# Patient Record
Sex: FEMALE | Race: WHITE | Employment: OTHER | URBAN - METROPOLITAN AREA
[De-identification: names, ages, dates, MRNs, and addresses within clinical notes are randomized per-mention and may not be internally consistent; named-entity substitution may affect disease eponyms.]

---

## 2017-01-20 ENCOUNTER — OFFICE VISIT (OUTPATIENT)
Dept: SURGERY | Facility: CLINIC | Age: 37
End: 2017-01-20

## 2017-01-20 VITALS — WEIGHT: 134.19 LBS | BODY MASS INDEX: 20.34 KG/M2 | HEIGHT: 68 IN

## 2017-01-20 DIAGNOSIS — K64.8 INTERNAL AND EXTERNAL THROMBOSED HEMORRHOIDS: Primary | ICD-10-CM

## 2017-01-20 DIAGNOSIS — K64.5 INTERNAL AND EXTERNAL THROMBOSED HEMORRHOIDS: Primary | ICD-10-CM

## 2017-01-20 PROCEDURE — 99204 OFFICE O/P NEW MOD 45 MIN: CPT | Performed by: SURGERY

## 2017-01-20 PROCEDURE — 46255 REMOVE INT/EXT HEM 1 GROUP: CPT | Performed by: SURGERY

## 2017-01-20 RX ORDER — PRAMOXINE HYDROCHLORIDE AND HYDROCORTISONE ACETATE 100; 100 MG/10G; MG/10G
AEROSOL, FOAM TOPICAL
Refills: 3 | COMMUNITY
Start: 2017-01-06 | End: 2019-09-16

## 2017-01-20 NOTE — PROGRESS NOTES
Visit Note    Active Problems   Internal and external thrombosed hemorrhoids  (primary encounter diagnosis)  Chief Complaint: Patient presents with:  Hemorrhoids: Patient referred by Dr. Madai Benton for hemorrhoids.  Patient states she was in [facility] for [place] Topics   Smoking status: Former Smoker  0.50 Packs/Day     Types: Cigarettes    Quit date: 07/01/2014    Smokeless tobacco: Former User    Alcohol Use: Yes  0.0 oz/week    0 Standard drinks or equivalent per week         Comment: 2-3 times a week     Drug Musculoskeletal: Normal range of motion. She exhibits no edema or tenderness. Neurological: She is alert and oriented to person, place, and time. She has normal reflexes. She displays normal reflexes. No cranial nerve deficit.  She exhibits normal

## 2017-01-23 NOTE — PROCEDURES
Pros and cons and possible complication were presented and discussed, infection, bleeding/Hematoma/ seroma,  Injury to nearby sphincter, recurrence, Failure to heal, fistula, cardiorespiratory events, Pain acute or Chronic, risks of local anesthesia, etc.

## 2017-01-23 NOTE — PATIENT INSTRUCTIONS
Simple Hemorrhoidectomy was done for a Right sided Thrombosed Hemorrhoid. Patient was instructed on post Hemorrhoidectomy Routine, local Care Analgesia and to return in 2 weeks for a wound check.  Also instructed to call ua as needed for any concerns regard

## 2020-02-28 ENCOUNTER — HOSPITAL ENCOUNTER (OUTPATIENT)
Age: 40
Discharge: HOME | End: 2020-02-28
Attending: EMERGENCY MEDICINE
Payer: COMMERCIAL

## 2020-02-28 VITALS
HEIGHT: 65 IN | TEMPERATURE: 99 F | RESPIRATION RATE: 20 BRPM | BODY MASS INDEX: 22.49 KG/M2 | OXYGEN SATURATION: 100 % | SYSTOLIC BLOOD PRESSURE: 135 MMHG | HEART RATE: 110 BPM | DIASTOLIC BLOOD PRESSURE: 94 MMHG | WEIGHT: 135 LBS

## 2020-02-28 DIAGNOSIS — J11.1 INFLUENZA: Primary | ICD-10-CM

## 2020-02-28 PROCEDURE — 99214 OFFICE O/P EST MOD 30 MIN: CPT

## 2020-02-28 PROCEDURE — 99213 OFFICE O/P EST LOW 20 MIN: CPT

## 2020-02-28 RX ORDER — OSELTAMIVIR PHOSPHATE 75 MG/1
75 CAPSULE ORAL 2 TIMES DAILY
Qty: 10 CAPSULE | Refills: 0 | Status: SHIPPED | OUTPATIENT
Start: 2020-02-28 | End: 2020-03-04

## 2020-02-29 NOTE — ED PROVIDER NOTES
Patient Seen in: [facility]      History   Patient presents with:  Cough/URI    Stated Complaint: cough    HPI    Patient is a 49-year-old female with a 1 day history of fever myalgias and cough cough.   There is no headac Eyes: Conjunctivae and EOM are normal. Pupils are equal, round, and reactive to light. Neck: Neck supple. Cardiovascular: Normal rate, regular rhythm, normal heart sounds and intact distal pulses.     Pulmonary/Chest: Effort normal and breath sounds n

## 2020-03-04 ENCOUNTER — OFFICE VISIT (OUTPATIENT)
Dept: INTERNAL MEDICINE CLINIC | Facility: CLINIC | Age: 40
End: 2020-03-04
Payer: COMMERCIAL

## 2020-03-04 VITALS
HEIGHT: 65 IN | BODY MASS INDEX: 21.39 KG/M2 | TEMPERATURE: 98 F | DIASTOLIC BLOOD PRESSURE: 85 MMHG | SYSTOLIC BLOOD PRESSURE: 121 MMHG | HEART RATE: 91 BPM | WEIGHT: 128.38 LBS

## 2020-03-04 DIAGNOSIS — R05.9 COUGH: ICD-10-CM

## 2020-03-04 DIAGNOSIS — J11.1 INFLUENZA: Primary | ICD-10-CM

## 2020-03-04 PROCEDURE — 99203 OFFICE O/P NEW LOW 30 MIN: CPT | Performed by: PHYSICIAN ASSISTANT

## 2020-03-04 RX ORDER — ALBUTEROL SULFATE 90 UG/1
2 AEROSOL, METERED RESPIRATORY (INHALATION) EVERY 4 HOURS PRN
Qty: 1 INHALER | Refills: 0 | Status: SHIPPED | OUTPATIENT
Start: 2020-03-04 | End: 2020-03-10

## 2020-03-04 RX ORDER — BENZONATATE 200 MG/1
200 CAPSULE ORAL 3 TIMES DAILY PRN
Qty: 30 CAPSULE | Refills: 0 | Status: SHIPPED | OUTPATIENT
Start: 2020-03-04 | End: 2020-03-10

## 2020-03-04 NOTE — PROGRESS NOTES
HPI:    Patient ID: Kelsey Jung is a 44year old female. HPI   Pt presents for follow up from , was diagnosed with the flu on 2/28 but did not start tamiflu because she was already nauseous. Notes fatigue and cough today.  No fever at this time m)   Wt 128 lb 6.4 oz (58.2 kg)   BMI 21.37 kg/m²   Body mass index is 21.37 kg/m². Physical Exam    Constitutional: She is oriented to person, place, and time and thin. She appears well-developed and well-nourished.    HENT:   Head: Normocephalic and atra

## 2020-03-10 ENCOUNTER — OFFICE VISIT (OUTPATIENT)
Dept: INTERNAL MEDICINE CLINIC | Facility: CLINIC | Age: 40
End: 2020-03-10
Payer: COMMERCIAL

## 2020-03-10 VITALS
HEIGHT: 65 IN | SYSTOLIC BLOOD PRESSURE: 103 MMHG | TEMPERATURE: 98 F | HEART RATE: 73 BPM | WEIGHT: 129.81 LBS | DIASTOLIC BLOOD PRESSURE: 71 MMHG | BODY MASS INDEX: 21.63 KG/M2

## 2020-03-10 DIAGNOSIS — Z00.00 ROUTINE PHYSICAL EXAMINATION: Primary | ICD-10-CM

## 2020-03-10 PROCEDURE — 99395 PREV VISIT EST AGE 18-39: CPT | Performed by: PHYSICIAN ASSISTANT

## 2020-03-10 NOTE — PROGRESS NOTES
HPI:    Patient ID: Edyta Reed is a 44year old female. HPI   Patient presents for a physical, doing well no complaints.  Last mammogram was a yeah and half ago, pt had breast pain, everything was normal. Last  Pap was done 2019 and everything was n normal.   Nose: Nose normal.   Mouth/Throat: Oropharynx is clear and moist.   Eyes: Pupils are equal, round, and reactive to light. Conjunctivae and EOM are normal.   Neck: Normal range of motion. Neck supple. No thyromegaly present.    Cardiovascular: Norm or ordered in this encounter       Imaging & Referrals:  None         ID#[id number]

## 2020-03-17 RX ORDER — ALBUTEROL SULFATE 90 UG/1
AEROSOL, METERED RESPIRATORY (INHALATION)
Qty: 6.7 INHALER | Refills: 0 | Status: SHIPPED | OUTPATIENT
Start: 2020-03-17 | End: 2021-09-10

## 2021-08-16 LAB — AMB EXT COVID-19 RESULT: DETECTED

## 2021-08-26 ENCOUNTER — HOSPITAL ENCOUNTER (OUTPATIENT)
Age: 41
Discharge: HOME | End: 2021-08-26
Payer: COMMERCIAL

## 2021-08-26 ENCOUNTER — APPOINTMENT (OUTPATIENT)
Dept: GENERAL RADIOLOGY | Age: 41
End: 2021-08-26
Attending: NURSE PRACTITIONER
Payer: COMMERCIAL

## 2021-08-26 VITALS
HEART RATE: 88 BPM | DIASTOLIC BLOOD PRESSURE: 71 MMHG | RESPIRATION RATE: 20 BRPM | TEMPERATURE: 101 F | OXYGEN SATURATION: 98 % | BODY MASS INDEX: 22 KG/M2 | SYSTOLIC BLOOD PRESSURE: 108 MMHG | WEIGHT: 130 LBS

## 2021-08-26 DIAGNOSIS — J12.82 PNEUMONIA DUE TO COVID-19 VIRUS: Primary | ICD-10-CM

## 2021-08-26 DIAGNOSIS — U07.1 PNEUMONIA DUE TO COVID-19 VIRUS: Primary | ICD-10-CM

## 2021-08-26 PROCEDURE — 71046 X-RAY EXAM CHEST 2 VIEWS: CPT | Performed by: NURSE PRACTITIONER

## 2021-08-26 PROCEDURE — 99213 OFFICE O/P EST LOW 20 MIN: CPT

## 2021-08-26 RX ORDER — ALBUTEROL SULFATE 90 UG/1
2 AEROSOL, METERED RESPIRATORY (INHALATION) EVERY 4 HOURS PRN
Qty: 8 G | Refills: 0 | Status: SHIPPED | OUTPATIENT
Start: 2021-08-26 | End: 2021-09-10

## 2021-08-26 RX ORDER — BENZONATATE 100 MG/1
100 CAPSULE ORAL 2 TIMES DAILY PRN
Qty: 10 CAPSULE | Refills: 0 | Status: SHIPPED | OUTPATIENT
Start: 2021-08-26 | End: 2021-09-10

## 2021-08-26 RX ORDER — ACETAMINOPHEN 500 MG
1000 TABLET ORAL ONCE
Status: COMPLETED | OUTPATIENT
Start: 2021-08-26 | End: 2021-08-26

## 2021-08-26 RX ORDER — DOXYCYCLINE HYCLATE 100 MG/1
100 CAPSULE ORAL 2 TIMES DAILY
Qty: 14 CAPSULE | Refills: 0 | Status: SHIPPED | OUTPATIENT
Start: 2021-08-26 | End: 2021-09-02

## 2021-08-26 RX ORDER — PREDNISONE 20 MG/1
40 TABLET ORAL DAILY
Qty: 10 TABLET | Refills: 0 | Status: SHIPPED | OUTPATIENT
Start: 2021-08-26 | End: 2021-08-31

## 2021-08-26 NOTE — ED PROVIDER NOTES
Patient Seen in: [facility]      History   Patient presents with:  Cough/URI    Stated Complaint: bad cough/ + for covid    HPI/Subjective:   HPI    This is a 44-year-old female presenting for cough.   Patient states, 2 weeks ago she developed External ear normal.      Nose: Nose normal.      Mouth/Throat:      Mouth: Mucous membranes are moist.      Pharynx: Oropharynx is clear. Eyes:      Conjunctiva/sclera: Conjunctivae normal.   Cardiovascular:      Rate and Rhythm: Normal rate.    Pulmonar paperwork. Patient acknowledged understanding instructions.                          Disposition and Plan     Clinical Impression:  Pneumonia due to COVID-19 virus  (primary encounter diagnosis)     Disposition:  Discharge  8/26/2021  4:21 pm    Follow-up:

## 2021-08-28 LAB — AMB EXT COVID-19 RESULT: NOT DETECTED

## 2021-08-31 ENCOUNTER — TELEPHONE (OUTPATIENT)
Dept: FAMILY MEDICINE CLINIC | Facility: CLINIC | Age: 41
End: 2021-08-31

## 2021-08-31 NOTE — TELEPHONE ENCOUNTER
I will only see this patient virtually. If she wants someone in person then another provider will have to see her.

## 2021-08-31 NOTE — TELEPHONE ENCOUNTER
Patient states she was seen in [facility]/26/[facility], treated for pneumonia. Patient started having symptoms of COVID-19  19 days ago on 8/13/21, positive test on 8/16/21.     Patient was prescribed steroids and antibiotics 8/26/21  for pneumonia and was

## 2021-09-10 ENCOUNTER — OFFICE VISIT (OUTPATIENT)
Dept: INTERNAL MEDICINE CLINIC | Facility: CLINIC | Age: 41
End: 2021-09-10
Payer: COMMERCIAL

## 2021-09-10 VITALS
BODY MASS INDEX: 21.33 KG/M2 | WEIGHT: 128 LBS | SYSTOLIC BLOOD PRESSURE: 107 MMHG | HEART RATE: 85 BPM | HEIGHT: 65 IN | RESPIRATION RATE: 17 BRPM | DIASTOLIC BLOOD PRESSURE: 69 MMHG

## 2021-09-10 DIAGNOSIS — Z86.16 HISTORY OF COVID-19: Primary | ICD-10-CM

## 2021-09-10 DIAGNOSIS — R05.8 DRY COUGH: ICD-10-CM

## 2021-09-10 DIAGNOSIS — S00.521A BLISTER OF LIP: ICD-10-CM

## 2021-09-10 PROCEDURE — 99214 OFFICE O/P EST MOD 30 MIN: CPT | Performed by: INTERNAL MEDICINE

## 2021-09-10 PROCEDURE — 3008F BODY MASS INDEX DOCD: CPT | Performed by: INTERNAL MEDICINE

## 2021-09-10 PROCEDURE — 3074F SYST BP LT 130 MM HG: CPT | Performed by: INTERNAL MEDICINE

## 2021-09-10 PROCEDURE — 3078F DIAST BP <80 MM HG: CPT | Performed by: INTERNAL MEDICINE

## 2021-09-10 NOTE — PROGRESS NOTES
Anaid Griggs is a 39year old female. Patient presents with: Follow - Up  Blisters: on lips.        HPI:   New pt   C/c urgent visit   C/o blister on lips after taking meds - on 8/26/2021 --doxy and prednisone -- did go to the beach a couple days after (Exact Date)   BMI 21.30 kg/m²   GENERAL: well developed, well nourished,in no apparent distress  SKIN: no rashes,no suspicious lesions  HEENT: atraumatic, normocephalic, lower lip with healed scabs - had pictures on the phone and noted it looked more like

## 2021-09-20 ENCOUNTER — HOSPITAL ENCOUNTER (OUTPATIENT)
Dept: GENERAL RADIOLOGY | Age: 41
Discharge: HOME | End: 2021-09-20
Attending: INTERNAL MEDICINE
Payer: COMMERCIAL

## 2021-09-20 DIAGNOSIS — Z86.16 HISTORY OF COVID-19: ICD-10-CM

## 2021-09-20 DIAGNOSIS — R05.8 DRY COUGH: ICD-10-CM

## 2021-09-20 PROCEDURE — 71046 X-RAY EXAM CHEST 2 VIEWS: CPT | Performed by: INTERNAL MEDICINE

## 2021-09-28 ENCOUNTER — LAB ENCOUNTER (OUTPATIENT)
Dept: LAB | Age: 41
End: 2021-09-28
Attending: INTERNAL MEDICINE
Payer: COMMERCIAL

## 2021-09-28 ENCOUNTER — OFFICE VISIT (OUTPATIENT)
Dept: INTERNAL MEDICINE CLINIC | Facility: CLINIC | Age: 41
End: 2021-09-28
Payer: COMMERCIAL

## 2021-09-28 VITALS
HEART RATE: 80 BPM | WEIGHT: 128 LBS | RESPIRATION RATE: 17 BRPM | SYSTOLIC BLOOD PRESSURE: 116 MMHG | DIASTOLIC BLOOD PRESSURE: 80 MMHG | HEIGHT: 65 IN | BODY MASS INDEX: 21.33 KG/M2

## 2021-09-28 DIAGNOSIS — R41.3 POOR MEMORY: ICD-10-CM

## 2021-09-28 DIAGNOSIS — Z86.16 HISTORY OF 2019 NOVEL CORONAVIRUS DISEASE (COVID-19): ICD-10-CM

## 2021-09-28 DIAGNOSIS — Z00.00 PHYSICAL EXAM, ANNUAL: ICD-10-CM

## 2021-09-28 DIAGNOSIS — Z01.419 ENCOUNTER FOR ROUTINE GYNECOLOGICAL EXAMINATION WITH PAPANICOLAOU SMEAR OF CERVIX: ICD-10-CM

## 2021-09-28 DIAGNOSIS — Z12.31 SCREENING MAMMOGRAM, ENCOUNTER FOR: ICD-10-CM

## 2021-09-28 DIAGNOSIS — Z00.00 PHYSICAL EXAM, ANNUAL: Primary | ICD-10-CM

## 2021-09-28 PROCEDURE — 99396 PREV VISIT EST AGE 40-64: CPT | Performed by: INTERNAL MEDICINE

## 2021-09-28 PROCEDURE — 3079F DIAST BP 80-89 MM HG: CPT | Performed by: INTERNAL MEDICINE

## 2021-09-28 PROCEDURE — 82746 ASSAY OF FOLIC ACID SERUM: CPT

## 2021-09-28 PROCEDURE — 80061 LIPID PANEL: CPT

## 2021-09-28 PROCEDURE — 86769 SARS-COV-2 COVID-19 ANTIBODY: CPT

## 2021-09-28 PROCEDURE — 85027 COMPLETE CBC AUTOMATED: CPT

## 2021-09-28 PROCEDURE — 3074F SYST BP LT 130 MM HG: CPT | Performed by: INTERNAL MEDICINE

## 2021-09-28 PROCEDURE — 82607 VITAMIN B-12: CPT

## 2021-09-28 PROCEDURE — 84443 ASSAY THYROID STIM HORMONE: CPT

## 2021-09-28 PROCEDURE — 80053 COMPREHEN METABOLIC PANEL: CPT

## 2021-09-28 PROCEDURE — 36415 COLL VENOUS BLD VENIPUNCTURE: CPT

## 2021-09-28 PROCEDURE — 3008F BODY MASS INDEX DOCD: CPT | Performed by: INTERNAL MEDICINE

## 2021-09-28 NOTE — PROGRESS NOTES
Bradford Theodore is a 39year old female.   Patient presents with:  Physical  Pap: 12/04/20218 last pap      HPI:   Patient comes for physical exam   C/C physical exam  C/O has some right sided pain x week --since covid   Worse with certain positions   No in depression    EXAM:   /80 (BP Location: Right arm, Patient Position: Sitting, Cuff Size: adult)   Pulse 80   Resp 17   Ht 5' 5\" (1.651 m)   Wt 128 lb (58.1 kg)   LMP 09/18/2021   BMI 21.30 kg/m²   GENERAL: well developed, well nourished,in no appare

## 2021-09-28 NOTE — PATIENT INSTRUCTIONS
Prevention Guidelines, Women Ages 36 to 52  Screening tests and vaccines are an important part of managing your health. A screening test is done to find diseases in people who don't have any symptoms.  The goal is to find a disease early so lifestyle vega sigmoidoscopy every 5 years, or  · Colonoscopy every 10 years, or  · CT colonography (virtual colonoscopy) every 5 years, or  · Yearly fecal occult blood test, or  · Yearly fecal immunochemical test every year, or  · Stool DNA test, every 3 years  If you c least 4 weeks after the first dose   Hepatitis A Women at increased risk for infection–talk with your healthcare provider 2 doses given 6 months apart   Hepatitis B Women at increased risk for infection–talk with your healthcare provider 3 doses over 6 mon American Academy of Ophthalmology  Sabrina last reviewed this educational content on 11/1/2017  © 9204-5011 [employer]. All rights reserved. This information is not intended as a substitute for professional medical care.  Always follow your

## 2021-10-29 ENCOUNTER — TELEPHONE (OUTPATIENT)
Dept: INTERNAL MEDICINE CLINIC | Facility: CLINIC | Age: 41
End: 2021-10-29

## 2021-10-29 NOTE — TELEPHONE ENCOUNTER
Late Entry. 10/28/21 at 11:39am.     Patient called office. RN spoke with patient. Patient's date of birth and full name both confirmed.      Patient calling about specific number of antibodies on SARS-COV-2 Total Antibody 9/28/21 Test. RN informed patient

## 2022-05-10 ENCOUNTER — OFFICE VISIT (OUTPATIENT)
Dept: INTERNAL MEDICINE CLINIC | Facility: CLINIC | Age: 42
End: 2022-05-10
Payer: COMMERCIAL

## 2022-05-10 VITALS
DIASTOLIC BLOOD PRESSURE: 73 MMHG | TEMPERATURE: 98 F | WEIGHT: 134 LBS | HEART RATE: 84 BPM | HEIGHT: 68 IN | SYSTOLIC BLOOD PRESSURE: 108 MMHG | BODY MASS INDEX: 20.31 KG/M2

## 2022-05-10 DIAGNOSIS — B02.9 HERPES ZOSTER WITHOUT COMPLICATION: Primary | ICD-10-CM

## 2022-05-10 DIAGNOSIS — M25.561 CHRONIC PAIN OF RIGHT KNEE: ICD-10-CM

## 2022-05-10 DIAGNOSIS — G89.29 CHRONIC PAIN OF RIGHT KNEE: ICD-10-CM

## 2022-05-10 PROCEDURE — 3008F BODY MASS INDEX DOCD: CPT | Performed by: INTERNAL MEDICINE

## 2022-05-10 PROCEDURE — 3074F SYST BP LT 130 MM HG: CPT | Performed by: INTERNAL MEDICINE

## 2022-05-10 PROCEDURE — 3078F DIAST BP <80 MM HG: CPT | Performed by: INTERNAL MEDICINE

## 2022-05-10 PROCEDURE — 99213 OFFICE O/P EST LOW 20 MIN: CPT | Performed by: INTERNAL MEDICINE

## 2022-05-10 RX ORDER — VALACYCLOVIR HYDROCHLORIDE 1 G/1
1000 TABLET, FILM COATED ORAL 3 TIMES DAILY
Qty: 21 TABLET | Refills: 0 | Status: SHIPPED | OUTPATIENT
Start: 2022-05-10 | End: 2022-05-17

## 2022-05-10 RX ORDER — ACYCLOVIR 50 MG/G
1 CREAM TOPICAL 3 TIMES DAILY PRN
Qty: 1 EACH | Refills: 0 | Status: SHIPPED | OUTPATIENT
Start: 2022-05-10

## 2022-12-06 ENCOUNTER — OFFICE VISIT (OUTPATIENT)
Dept: OBGYN CLINIC | Facility: CLINIC | Age: 42
End: 2022-12-06
Payer: COMMERCIAL

## 2022-12-06 VITALS
BODY MASS INDEX: 20.18 KG/M2 | DIASTOLIC BLOOD PRESSURE: 78 MMHG | WEIGHT: 133.19 LBS | HEART RATE: 80 BPM | SYSTOLIC BLOOD PRESSURE: 112 MMHG | HEIGHT: 68 IN

## 2022-12-06 DIAGNOSIS — Z12.31 ENCOUNTER FOR SCREENING MAMMOGRAM FOR MALIGNANT NEOPLASM OF BREAST: ICD-10-CM

## 2022-12-06 DIAGNOSIS — Z12.4 SCREENING FOR MALIGNANT NEOPLASM OF CERVIX: ICD-10-CM

## 2022-12-06 DIAGNOSIS — Z01.419 WELL WOMAN EXAM WITH ROUTINE GYNECOLOGICAL EXAM: Primary | ICD-10-CM

## 2022-12-06 PROCEDURE — 3074F SYST BP LT 130 MM HG: CPT | Performed by: NURSE PRACTITIONER

## 2022-12-06 PROCEDURE — 99386 PREV VISIT NEW AGE 40-64: CPT | Performed by: NURSE PRACTITIONER

## 2022-12-06 PROCEDURE — 3008F BODY MASS INDEX DOCD: CPT | Performed by: NURSE PRACTITIONER

## 2022-12-06 PROCEDURE — 3078F DIAST BP <80 MM HG: CPT | Performed by: NURSE PRACTITIONER

## 2022-12-07 LAB — HPV I/H RISK 1 DNA SPEC QL NAA+PROBE: NEGATIVE

## 2023-02-24 ENCOUNTER — HOSPITAL ENCOUNTER (OUTPATIENT)
Dept: MAMMOGRAPHY | Age: 43
Discharge: HOME | End: 2023-02-24
Attending: NURSE PRACTITIONER
Payer: COMMERCIAL

## 2023-02-24 DIAGNOSIS — Z12.31 ENCOUNTER FOR SCREENING MAMMOGRAM FOR MALIGNANT NEOPLASM OF BREAST: ICD-10-CM

## 2023-02-24 PROCEDURE — 77067 SCR MAMMO BI INCL CAD: CPT | Performed by: NURSE PRACTITIONER

## 2023-02-24 PROCEDURE — 77063 BREAST TOMOSYNTHESIS BI: CPT | Performed by: NURSE PRACTITIONER

## 2023-03-02 ENCOUNTER — PATIENT MESSAGE (OUTPATIENT)
Dept: INTERNAL MEDICINE CLINIC | Facility: CLINIC | Age: 43
End: 2023-03-02

## 2023-03-07 ENCOUNTER — HOSPITAL ENCOUNTER (OUTPATIENT)
Dept: GENERAL RADIOLOGY | Facility: HOSPITAL | Age: 43
Discharge: HOME | End: 2023-03-07
Attending: INTERNAL MEDICINE
Payer: COMMERCIAL

## 2023-03-07 ENCOUNTER — OFFICE VISIT (OUTPATIENT)
Dept: INTERNAL MEDICINE CLINIC | Facility: CLINIC | Age: 43
End: 2023-03-07

## 2023-03-07 VITALS
HEIGHT: 68 IN | DIASTOLIC BLOOD PRESSURE: 76 MMHG | WEIGHT: 137 LBS | BODY MASS INDEX: 20.76 KG/M2 | SYSTOLIC BLOOD PRESSURE: 104 MMHG

## 2023-03-07 DIAGNOSIS — M25.561 CHRONIC PAIN OF RIGHT KNEE: ICD-10-CM

## 2023-03-07 DIAGNOSIS — M77.8 THUMB TENDONITIS: ICD-10-CM

## 2023-03-07 DIAGNOSIS — S69.92XA INJURY OF LEFT THUMB, INITIAL ENCOUNTER: ICD-10-CM

## 2023-03-07 DIAGNOSIS — G89.29 CHRONIC PAIN OF RIGHT KNEE: ICD-10-CM

## 2023-03-07 DIAGNOSIS — S69.92XA INJURY OF LEFT THUMB, INITIAL ENCOUNTER: Primary | ICD-10-CM

## 2023-03-07 PROCEDURE — 73140 X-RAY EXAM OF FINGER(S): CPT | Performed by: INTERNAL MEDICINE

## 2023-03-07 PROCEDURE — 73564 X-RAY EXAM KNEE 4 OR MORE: CPT | Performed by: INTERNAL MEDICINE

## 2023-03-07 PROCEDURE — 3008F BODY MASS INDEX DOCD: CPT | Performed by: INTERNAL MEDICINE

## 2023-03-07 PROCEDURE — 99213 OFFICE O/P EST LOW 20 MIN: CPT | Performed by: INTERNAL MEDICINE

## 2023-03-07 PROCEDURE — 3074F SYST BP LT 130 MM HG: CPT | Performed by: INTERNAL MEDICINE

## 2023-03-07 PROCEDURE — 3078F DIAST BP <80 MM HG: CPT | Performed by: INTERNAL MEDICINE

## 2023-11-09 ENCOUNTER — OFFICE VISIT (OUTPATIENT)
Dept: INTERNAL MEDICINE CLINIC | Facility: CLINIC | Age: 43
End: 2023-11-09

## 2023-11-09 ENCOUNTER — LAB ENCOUNTER (OUTPATIENT)
Dept: LAB | Age: 43
End: 2023-11-09
Attending: INTERNAL MEDICINE
Payer: COMMERCIAL

## 2023-11-09 VITALS
BODY MASS INDEX: 20.92 KG/M2 | OXYGEN SATURATION: 100 % | HEART RATE: 84 BPM | SYSTOLIC BLOOD PRESSURE: 120 MMHG | WEIGHT: 138 LBS | DIASTOLIC BLOOD PRESSURE: 77 MMHG | HEIGHT: 68 IN

## 2023-11-09 DIAGNOSIS — R10.13 DYSPEPSIA: ICD-10-CM

## 2023-11-09 DIAGNOSIS — R73.09 ABNORMAL BLOOD SUGAR: ICD-10-CM

## 2023-11-09 DIAGNOSIS — R14.0 BLOATING: ICD-10-CM

## 2023-11-09 DIAGNOSIS — R10.13 EPIGASTRIC PAIN: ICD-10-CM

## 2023-11-09 DIAGNOSIS — E16.2 HYPOGLYCEMIA: ICD-10-CM

## 2023-11-09 DIAGNOSIS — Z00.00 ANNUAL PHYSICAL EXAM: ICD-10-CM

## 2023-11-09 DIAGNOSIS — Z12.31 BREAST CANCER SCREENING BY MAMMOGRAM: Primary | ICD-10-CM

## 2023-11-09 LAB
ALBUMIN SERPL-MCNC: 4.4 G/DL (ref 3.2–4.8)
ALBUMIN/GLOB SERPL: 1.8 {RATIO} (ref 1–2)
ALP LIVER SERPL-CCNC: 45 U/L
ALT SERPL-CCNC: 9 U/L
ANION GAP SERPL CALC-SCNC: 7 MMOL/L (ref 0–18)
AST SERPL-CCNC: 15 U/L (ref ?–34)
BASOPHILS # BLD AUTO: 0.02 X10(3) UL (ref 0–0.2)
BASOPHILS NFR BLD AUTO: 0.4 %
BILIRUB SERPL-MCNC: 1.2 MG/DL (ref 0.3–1.2)
BUN BLD-MCNC: 12 MG/DL (ref 9–23)
BUN/CREAT SERPL: 12.9 (ref 10–20)
CALCIUM BLD-MCNC: 9.5 MG/DL (ref 8.7–10.4)
CHLORIDE SERPL-SCNC: 104 MMOL/L (ref 98–112)
CHOLEST SERPL-MCNC: 197 MG/DL (ref ?–200)
CO2 SERPL-SCNC: 28 MMOL/L (ref 21–32)
CREAT BLD-MCNC: 0.93 MG/DL
DEPRECATED RDW RBC AUTO: 40.2 FL (ref 35.1–46.3)
EGFRCR SERPLBLD CKD-EPI 2021: 78 ML/MIN/1.73M2 (ref 60–?)
EOSINOPHIL # BLD AUTO: 0.09 X10(3) UL (ref 0–0.7)
EOSINOPHIL NFR BLD AUTO: 1.8 %
ERYTHROCYTE [DISTWIDTH] IN BLOOD BY AUTOMATED COUNT: 11.6 % (ref 11–15)
EST. AVERAGE GLUCOSE BLD GHB EST-MCNC: 103 MG/DL (ref 68–126)
FASTING PATIENT LIPID ANSWER: NO
FASTING STATUS PATIENT QL REPORTED: NO
GLOBULIN PLAS-MCNC: 2.4 G/DL (ref 2.8–4.4)
GLUCOSE BLD-MCNC: 93 MG/DL (ref 70–99)
HBA1C MFR BLD: 5.2 % (ref ?–5.7)
HCT VFR BLD AUTO: 36.2 %
HDLC SERPL-MCNC: 82 MG/DL (ref 40–59)
HGB BLD-MCNC: 12.5 G/DL
IMM GRANULOCYTES # BLD AUTO: 0.01 X10(3) UL (ref 0–1)
IMM GRANULOCYTES NFR BLD: 0.2 %
LDLC SERPL CALC-MCNC: 90 MG/DL (ref ?–100)
LYMPHOCYTES # BLD AUTO: 1.89 X10(3) UL (ref 1–4)
LYMPHOCYTES NFR BLD AUTO: 37.1 %
MCH RBC QN AUTO: 33 PG (ref 26–34)
MCHC RBC AUTO-ENTMCNC: 34.5 G/DL (ref 31–37)
MCV RBC AUTO: 95.5 FL
MONOCYTES # BLD AUTO: 0.39 X10(3) UL (ref 0.1–1)
MONOCYTES NFR BLD AUTO: 7.7 %
NEUTROPHILS # BLD AUTO: 2.69 X10 (3) UL (ref 1.5–7.7)
NEUTROPHILS # BLD AUTO: 2.69 X10(3) UL (ref 1.5–7.7)
NEUTROPHILS NFR BLD AUTO: 52.8 %
NONHDLC SERPL-MCNC: 115 MG/DL (ref ?–130)
OSMOLALITY SERPL CALC.SUM OF ELEC: 287 MOSM/KG (ref 275–295)
PLATELET # BLD AUTO: 236 10(3)UL (ref 150–450)
POTASSIUM SERPL-SCNC: 3.6 MMOL/L (ref 3.5–5.1)
PROT SERPL-MCNC: 6.8 G/DL (ref 5.7–8.2)
RBC # BLD AUTO: 3.79 X10(6)UL
SODIUM SERPL-SCNC: 139 MMOL/L (ref 136–145)
TRIGL SERPL-MCNC: 146 MG/DL (ref 30–149)
TSI SER-ACNC: 0.91 MIU/ML (ref 0.55–4.78)
VIT D+METAB SERPL-MCNC: 24 NG/ML (ref 30–100)
VLDLC SERPL CALC-MCNC: 24 MG/DL (ref 0–30)
WBC # BLD AUTO: 5.1 X10(3) UL (ref 4–11)

## 2023-11-09 PROCEDURE — 36415 COLL VENOUS BLD VENIPUNCTURE: CPT

## 2023-11-09 PROCEDURE — 80061 LIPID PANEL: CPT

## 2023-11-09 PROCEDURE — 3078F DIAST BP <80 MM HG: CPT | Performed by: INTERNAL MEDICINE

## 2023-11-09 PROCEDURE — 3008F BODY MASS INDEX DOCD: CPT | Performed by: INTERNAL MEDICINE

## 2023-11-09 PROCEDURE — 86003 ALLG SPEC IGE CRUDE XTRC EA: CPT

## 2023-11-09 PROCEDURE — 83036 HEMOGLOBIN GLYCOSYLATED A1C: CPT

## 2023-11-09 PROCEDURE — 82785 ASSAY OF IGE: CPT

## 2023-11-09 PROCEDURE — 85025 COMPLETE CBC W/AUTO DIFF WBC: CPT

## 2023-11-09 PROCEDURE — 99214 OFFICE O/P EST MOD 30 MIN: CPT | Performed by: INTERNAL MEDICINE

## 2023-11-09 PROCEDURE — 3074F SYST BP LT 130 MM HG: CPT | Performed by: INTERNAL MEDICINE

## 2023-11-09 PROCEDURE — 99396 PREV VISIT EST AGE 40-64: CPT | Performed by: INTERNAL MEDICINE

## 2023-11-09 PROCEDURE — 82306 VITAMIN D 25 HYDROXY: CPT

## 2023-11-09 PROCEDURE — 84443 ASSAY THYROID STIM HORMONE: CPT

## 2023-11-09 PROCEDURE — 83013 H PYLORI (C-13) BREATH: CPT

## 2023-11-09 PROCEDURE — 80053 COMPREHEN METABOLIC PANEL: CPT

## 2023-11-09 NOTE — PROGRESS NOTES
Diana Wilkerson is a 37year old female who is here for  1. Breast cancer screening by mammogram    2. Annual physical exam    3. Abnormal blood sugar    4. Hypoglycemia    5. Bloating    6. Epigastric pain    7. Dyspepsia        HPI:   Diana Wilkerson is a 37year old female presents for annual physical   On Saturday, she woke and felt palpitations and started shaking, took a shower, then she ate something and she felt better. Epigastric gastric   -10 cig.daily, quit 4 years ago, started smoking in highschool, does not take -NSAIDS, no change in bowel movement, no melena or black or red stools. Past Medical History:   Diagnosis Date    Decorative tattoo      Past Surgical History:   Procedure Laterality Date    CORRECTION HALLUX VALGUS Bilateral 2012    D & C  2013    INSERT INTRAUTERINE DEVICE            x2    REMOVE INTRAUTERINE DEVICE         Current Outpatient Medications:     Acyclovir 5 % External Cream, Apply 1 Application topically 3 (three) times daily as needed. (Patient not taking: Reported on 2023), Disp: 1 each, Rfl: 0    Allergies:   Allergies   Allergen Reactions    Doxycycline RASH     Social History     Socioeconomic History    Marital status:      Spouse name: Not on file    Number of children: 2    Years of education: Not on file    Highest education level: Not on file   Occupational History    Occupation: Homemaker   Tobacco Use    Smoking status: Former     Packs/day: .5     Types: Cigarettes     Quit date: 2014     Years since quittin.3    Smokeless tobacco: Former   Vaping Use    Vaping Use: Never used   Substance and Sexual Activity    Alcohol use: Yes     Comment: 2-3 times a week     Drug use: No    Sexual activity: Yes     Partners: Male   Other Topics Concern    Not on file   Social History Narrative    Not on file     Social Determinants of Health     Financial Resource Strain: Not on file   Food Insecurity: Not on file   Transportation Needs: Not on file   Physical Activity: Not on file   Stress: Not on file   Social Connections: Not on file   Housing Stability: Not on file       REVIEW OF SYSTEMS:     GENERAL HEALTH: No fevers, chills, sweats, fatigue  VISION: No recent vision problems, blurry vision or double vision  HEENT: No decreased hearing ear pain nasal congestion or sore throat  SKIN: denies any unusual skin lesions or rashes  RESPIRATORY: denies shortness of breath, cough, wheezing  CARDIOVASCULAR: denies chest pain on exertion, palpitations, swelling in feet  GI: +abdominal pain and denies heartburn, nausea or vomiting  : No Pain on urination, change in the color of urine, discharge, urinating frequently  MUS: No back pain, joint pain, muscle pain  NEURO: denies headaches , anxiety, depression    EXAM:     Vitals:    11/09/23 1250   BP: 120/77   Pulse: 84   SpO2: 100%   Weight: 138 lb (62.6 kg)   Height: 5' 8\" (1.727 m)     GENERAL: well developed, well nourished,in no apparent distress  SKIN: no rashes,no suspicious lesions  HEENT: atraumatic, normocephalic,ears and throat are clear,   NECK: supple,no adenopathy,  LUNGS: clear to auscultation, no wheeze  CARDIO: RRR without murmur  GI: good BS's,no masses or tenderness  EXTREMITIES: no cyanosis, or edema    ASSESSMENT AND PLAN:     1. Breast cancer screening by mammogram  - [facility] SCREENING BILAT (CPT=77067); Future    2. Annual physical exam  - CBC With Differential With Platelet; Future  - Comp Metabolic Panel (14); Future  - TSH W Reflex To Free T4; Future  - Lipid Panel; Future  - Vitamin D [E]; Future    3. Abnormal blood sugar  4. Hypoglycemia  -felt she was dizzy and when she ate something she felt better  -mother with diabetes  Plan:  - Hemoglobin A1C [E]; Future      5. Bloating  -after she eats specific foods  Plan  - GASTRO - INTERNAL  - Adult Food Allergy Prof [E]; Future  - Vitamin D [E]; Future    6. Epigastric pain  7.  Dyspepsia  -epigastric discomfort after eating, nonspecific to specific  food  -hx smoking 12 pack years, quit 4 years ago  Plan  - GASTRO - INTERNAL  - Helicobacter Pylori Breath Test, Adult; Future  - Vitamin D [E];  Future      Health Maintenance   Topic Date Due    Influenza Vaccine (1) 11/09/2024 (Originally 10/1/2023)    COVID-19 Vaccine (1) 11/09/2024 (Originally 2/25/1981)    Mammogram  02/24/2024    Annual Physical  11/09/2024    DTaP,Tdap,and Td Vaccines (2 - Td or Tdap) 02/10/2025    Pap Smear  12/06/2025    Annual Depression Screening  Completed    Pneumococcal Vaccine: Birth to Ghislaine Rasheed MD  11/9/2023

## 2023-11-10 LAB
CLAM IGE QN: <0.1 KUA/L (ref ?–0.1)
CODFISH IGE QN: <0.1 KUA/L (ref ?–0.1)
CORN IGE QN: 0.19 KUA/L (ref ?–0.1)
COW MILK IGE QN: <0.1 KUA/L (ref ?–0.1)
EGG WHITE IGE QN: <0.1 KUA/L (ref ?–0.1)
IGE SERPL-ACNC: 21.3 KU/L (ref 2–214)
PEANUT IGE QN: 0.28 KUA/L (ref ?–0.1)
SCALLOP IGE QN: 0.12 KUA/L (ref ?–0.1)
SESAME SEED IGE QN: 0.26 KUA/L (ref ?–0.1)
SHRIMP IGE QN: <0.1 KUA/L (ref ?–0.1)
SOYBEAN IGE QN: 0.18 KUA/L (ref ?–0.1)
WALNUT IGE QN: 0.13 KUA/L (ref ?–0.1)
WHEAT IGE QN: 0.22 KUA/L (ref ?–0.1)

## 2023-11-12 LAB — H PYLORI BREATH TEST: NEGATIVE

## 2023-11-13 RX ORDER — ERGOCALCIFEROL 1.25 MG/1
50000 CAPSULE ORAL WEEKLY
Qty: 12 CAPSULE | Refills: 0 | Status: SHIPPED | OUTPATIENT
Start: 2023-11-13 | End: 2024-01-30

## 2024-01-18 ENCOUNTER — OFFICE VISIT (OUTPATIENT)
Dept: OBGYN CLINIC | Facility: CLINIC | Age: 44
End: 2024-01-18

## 2024-01-18 VITALS
HEIGHT: 68 IN | WEIGHT: 136 LBS | SYSTOLIC BLOOD PRESSURE: 102 MMHG | DIASTOLIC BLOOD PRESSURE: 68 MMHG | BODY MASS INDEX: 20.61 KG/M2 | HEART RATE: 78 BPM

## 2024-01-18 DIAGNOSIS — N94.6 DYSMENORRHEA: ICD-10-CM

## 2024-01-18 DIAGNOSIS — Z01.419 WELL WOMAN EXAM WITH ROUTINE GYNECOLOGICAL EXAM: Primary | ICD-10-CM

## 2024-01-18 PROCEDURE — 3074F SYST BP LT 130 MM HG: CPT | Performed by: NURSE PRACTITIONER

## 2024-01-18 PROCEDURE — 99396 PREV VISIT EST AGE 40-64: CPT | Performed by: NURSE PRACTITIONER

## 2024-01-18 PROCEDURE — 3078F DIAST BP <80 MM HG: CPT | Performed by: NURSE PRACTITIONER

## 2024-01-18 PROCEDURE — 3008F BODY MASS INDEX DOCD: CPT | Performed by: NURSE PRACTITIONER

## 2024-01-18 NOTE — PROGRESS NOTES
[facility]    Obstetrics and Gynecology    Chief Complaint   Patient presents with    Gyn Exam     ANNUAL EXAM       Eusebia Avendano is a 43 year old female  Patient's last menstrual period was 2024 (exact date). presenting for annual gynecology exam.  Last seen 2022. Feeling okay. Periods are every month, lasts 5-6 days. States heavy flow for 2 days changing her pad every 2 hours. Increased pain \"as I get older\", increased since last visit, now needing to take aleeve for pain- cramping improves. States has had heavy periods since her last child born. Did try mirena for 2 years and helped with periods but had it removed due to concerns for adverse reactions she was hearing about.  No constipation, no painful bowel movements.    , using vasectomy. No pain or bleeding. No abnormal discharge.    Recent labs in 2023 normal with PCP.    Pap:2022 NILM, neg HPV ; due in   No history of abnormal paps  Contraception:vasectomy  Mammo: 2023 normal; scheduled  Colonoscopy: n/a    OBSTETRICS HISTORY:  OB History    Para Term  AB Living   3 2 2 0 1 2   SAB IAB Ectopic Multiple Live Births   1 0 0 0 2       GYNE HISTORY:  Menarche: 13 (2024 10:37 AM)  Period Cycle (Days): MONTHLY (2024 10:37 AM)  Period Duration (Days): 5 TO 7 DAYS (2024 10:37 AM)  Period Flow: HEAVY FOR 2 DAYS  AND LIGHT (2024 10:37 AM)  Use of Birth Control (if yes, specify type): Vasectomy (2024 10:37 AM)  Pap Date: 22 (2024 10:37 AM)  Pap Result Notes: neg pap / neg hpv (2024 10:37 AM)      History   Sexual Activity    Sexual activity: Yes    Partners: Male           Latest Ref Rng & Units 2022    11:49 AM   RECENT PAP RESULTS   Thinprep Pap Negative for intraepithelial lesion or malignancy Negative for intraepithelial lesion or malignancy    HPV Negative Negative          MEDICAL HISTORY:  Past Medical History:   Diagnosis Date    Decorative tattoo       Past Surgical History:   Procedure Laterality Date    CORRECTION HALLUX VALGUS Bilateral 2012    D & C  2013    INSERT INTRAUTERINE DEVICE            x2    REMOVE INTRAUTERINE DEVICE         SOCIAL HISTORY:  Social History     Socioeconomic History    Marital status:      Spouse name: Not on file    Number of children: 2    Years of education: Not on file    Highest education level: Not on file   Occupational History    Occupation: Homemaker   Tobacco Use    Smoking status: Former     Packs/day: .5     Types: Cigarettes     Quit date: 2014     Years since quittin.5    Smokeless tobacco: Former   Vaping Use    Vaping Use: Never used   Substance and Sexual Activity    Alcohol use: Yes     Comment: 2-3 times a week     Drug use: No    Sexual activity: Yes     Partners: Male   Other Topics Concern    Not on file   Social History Narrative    Not on file     Social Determinants of Health     Financial Resource Strain: Not on file   Food Insecurity: Not on file   Transportation Needs: Not on file   Physical Activity: Not on file   Stress: Not on file   Social Connections: Not on file   Housing Stability: Not on file         Depression Screening (PHQ-2/PHQ-9): Over the LAST 2 WEEKS   Little interest or pleasure in doing things: Not at all    Feeling down, depressed, or hopeless: Not at all    PHQ-2 SCORE: 0           FAMILY HISTORY:  Family History   Problem Relation Age of Onset    Diabetes Mother     Heart Disorder Neg     Breast Cancer Neg     Breast Cancer 2nd occurrence Neg     Ovarian Cancer Neg        MEDICATIONS:    Current Outpatient Medications:     ergocalciferol 1.25 MG (53366 UT) Oral Cap, Take 1 capsule (50,000 Units total) by mouth once a week for 12 doses., Disp: 12 capsule, Rfl: 0    ALLERGIES:    Allergies   Allergen Reactions    Doxycycline RASH         Review of Systems:  Constitutional:  Denies fatigue, night sweats, hot flashes  Eyes:  denies blurred or double  vision  Cardiovascular:  denies chest pain or palpitations  Respiratory:  denies shortness of breath  Gastrointestinal:  denies heartburn, abdominal pain, diarrhea or constipation  Genitourinary:  denies dysuria, incontinence, abnormal vaginal discharge, vaginal itching, see HPI  Musculoskeletal:  denies back pain   Skin/Breast:  Denies any breast pain, lumps, or discharge.   Neurological:  denies headaches, extremity weakness or numbness.  Psychiatric: denies depression or anxiety.  Endocrine:   denies excessive thirst or urination.  Heme/Lymph:  denies history of anemia, easy bruising or bleeding.      PHYSICAL EXAM:     Vitals:    01/18/24 1040   BP: 102/68   Pulse: 78   Weight: 136 lb (61.7 kg)   Height: 5' 8\" (1.727 m)       Body mass index is 20.68 kg/m².     Constitutional: well developed, well nourished  Psychiatric:  Oriented to time, place, person and situation. Appropriate mood and affect  Head/Face: normocephalic  Neck/Thyroid: thyroid symmetric, no thyromegaly, no nodules, no adenopathy  Lymphatic:no abnormal supraclavicular or axillary adenopathy is noted  Breast: normal without palpable masses, tenderness, asymmetry, nipple discharge, nipple retraction or skin changes  Abdomen:  soft, nontender, nondistended, no masses  Skin/Hair: no unusual rashes or bruises  Extremities: no edema, no cyanosis    Pelvic Exam:  External Genitalia: normal appearance, hair distribution, and no lesions  Urethral Meatus:  normal in size, location, without lesions and prolapse  Bladder:  No fullness, masses or tenderness  Vagina:  Normal appearance without lesions, no abnormal discharge  Cervix:  Normal without tenderness on motion  Uterus: normal in size, contour, position, mobility, without tenderness  Adnexa: normal without masses or tenderness  Perineum: normal  Anus: no hemorroids     Assessment & Plan:    ICD-10-CM    1. Well woman exam with routine gynecological exam  Z01.419       2. Dysmenorrhea  N94.6 US PELVIS W  AELXUS (CPT=76856/67791)         Reviewed ASCCP guidelines with the patient   Pap UTD  Contraception: Using vasectomy  Dysmenorrhea - pelvic US ordered, rev'd treating with cyclic NSAIDs on period, or hormonal contraceptive options which she declines at this time. Rev'd to call if any AUB. Rev'd reasons for dysmenorrhea.  Breast Health:     Reviewed current guidelines with the patient and to start Mammograms at age 40  Reviewed monthly self breast exams with the patient   Discussed diet, exercise, MVIs with Ca/Vit D  Follow up in 1 yr for YOUSUF Hardin    This note was prepared using Dragon [employer] voice recognition dictation software. As a result errors may occur. When identified these errors have been corrected. While every attempt is made to correct errors during dictation discrepancies may still exist.

## 2024-02-27 ENCOUNTER — HOSPITAL ENCOUNTER (OUTPATIENT)
Dept: MAMMOGRAPHY | Facility: HOSPITAL | Age: 44
Discharge: HOME | End: 2024-02-27
Attending: INTERNAL MEDICINE
Payer: COMMERCIAL

## 2024-02-27 DIAGNOSIS — Z12.31 BREAST CANCER SCREENING BY MAMMOGRAM: ICD-10-CM

## 2024-02-27 PROCEDURE — 77063 BREAST TOMOSYNTHESIS BI: CPT | Performed by: INTERNAL MEDICINE

## 2024-02-27 PROCEDURE — 77067 SCR MAMMO BI INCL CAD: CPT | Performed by: INTERNAL MEDICINE

## 2024-02-29 ENCOUNTER — PATIENT MESSAGE (OUTPATIENT)
Dept: OBGYN CLINIC | Facility: CLINIC | Age: 44
End: 2024-02-29

## 2024-02-29 NOTE — TELEPHONE ENCOUNTER
From: Eusebia Avendano  To: Caprice Quijano  Sent: 2/29/2024 11:22 AM CST  Subject: Pelvis ultrasound     Hi dr. Quijano  I don’t know what kind of magic you did but after I told you about my painful and very heavy flow periods it all stopped. I actually had an appointment made but I canceled since it got much better. I haven’t had a manageable period in years until now. I will hold off with the ultrasound   Take care and have a great day   Eusebia Avendano

## 2024-03-21 ENCOUNTER — OFFICE VISIT (OUTPATIENT)
Facility: CLINIC | Age: 44
End: 2024-03-21

## 2024-03-21 VITALS — BODY MASS INDEX: 20.76 KG/M2 | WEIGHT: 137 LBS | HEIGHT: 68 IN

## 2024-03-21 DIAGNOSIS — R10.13 EPIGASTRIC PAIN: Primary | ICD-10-CM

## 2024-03-21 PROCEDURE — 3008F BODY MASS INDEX DOCD: CPT | Performed by: INTERNAL MEDICINE

## 2024-03-21 PROCEDURE — 99244 OFF/OP CNSLTJ NEW/EST MOD 40: CPT | Performed by: INTERNAL MEDICINE

## 2024-03-21 NOTE — H&P
[facility] - Gastroenterology                                                                                                               Reason for consult: epigastric pain     Requesting physician or provider: Vida Tanner MD    Chief Complaint   Patient presents with    Consult     Epigastric pain;        HPI:   Eusebia Avendano is a 43 year old woman with no PMHx presenting for evaluation of epigastric pain.     She notes pain to her epigastrum after eating. Pain started about 1 year ago. Pain can last up to 8 hours. She notes that pain is usually triggered by bread, but is unsure if she has other food triggers. Pain does not occur in the absence of eating. She denies nausea and vomiting. She notes rare heartburn. She thinks she has had heartburn 4 times in her life. This is brought on by tomato-based sauces or red wine. She normally has a BM every other day. She previously was moving her bowels daily. She notes formed stools. She notes rare straining or hard stools. She has not tried anything for her pain.     She reports NSAID use around the time of her menstrual period for cramps.     She denies family history of GI malignancies and IBD.     Prior endoscopies:  none    Soc:  -denies smoking  -endorses EtOH use on the weekends - 3-4 drinks   -occasional THC gummies     Wt Readings from Last 6 Encounters:   24 137 lb (62.1 kg)   24 136 lb (61.7 kg)   23 138 lb (62.6 kg)   23 137 lb (62.1 kg)   22 133 lb 3.2 oz (60.4 kg)   05/10/22 134 lb (60.8 kg)        History, Medications, Allergies, ROS:      Past Medical History:   Diagnosis Date    Decorative tattoo       Past Surgical History:   Procedure Laterality Date    CORRECTION HALLUX VALGUS Bilateral 2012    D & C  2013    INSERT INTRAUTERINE DEVICE            x2    REMOVE INTRAUTERINE DEVICE        Family Hx:   Family  History   Problem Relation Age of Onset    Diabetes Mother     Heart Disorder Neg     Breast Cancer Neg     Breast Cancer 2nd occurrence Neg     Ovarian Cancer Neg       Social History:   Social History     Socioeconomic History    Marital status:     Number of children: 2   Occupational History    Occupation: Homemaker   Tobacco Use    Smoking status: Former     Packs/day: .5     Types: Cigarettes     Quit date: 2014     Years since quittin.7    Smokeless tobacco: Former   Vaping Use    Vaping Use: Never used   Substance and Sexual Activity    Alcohol use: Yes     Comment: 2-3 times a week     Drug use: No    Sexual activity: Yes     Partners: Male        Medications (Active prior to today's visit):  No current outpatient medications on file.       Allergies:  Allergies   Allergen Reactions    Doxycycline RASH       ROS:   CONSTITUTIONAL:  negative for fevers, rigors  EYES:  negative for diplopia   RESPIRATORY:  negative for severe shortness of breath  CARDIOVASCULAR:  negative for crushing sub-sternal chest pain  GASTROINTESTINAL:  see HPI  GENITOURINARY:  negative for dysuria or gross hematuria  INTEGUMENT/BREAST:  SKIN:  negative for jaundice   ALLERGIC/IMMUNOLOGIC:  negative for hay fever  ENDOCRINE:  negative for cold intolerance and heat intolerance  MUSCULOSKELETAL:  negative for joint effusion/severe erythema  BEHAVIOR/PSYCH:  negative for psychotic behavior    PHYSICAL EXAM:   Height 5' 8\" (1.727 m), weight 137 lb (62.1 kg), last menstrual period 2024.    Gen: appears comfortable and in no acute distress  HEENT: sclera appear anicteric, mucus membranes appear moist  CV: the extremities are warm and well-perfused   Lung: no increased work of breathing, no conversational dyspnea   Abd: soft, non-tender exam in all quadrants without rigidity or guarding, non-distended, no masses palpated  Skin: no jaundice, no apparent rashes   Neuro: alert and interactive, no focal neuro deficits  Psych:  cooperative, normal affect     Labs/Imaging:     Patient's pertinent labs and imaging were reviewed and discussed with patient today.      ASSESSMENT/PLAN:   Eusebia Avendano is a 43 year old woman with no PMHx presenting for evaluation of epigastric pain.     She notes epigastric pain after eating, primarily eating bread, that has been occurring for about 1 year. Pain can last up to 8 hours. She denies significant heartburn or reflux. She would like to be tested for celiac disease. We also discussed RUQ US, PPI trial and possible EGD, but she would prefer to first have the celiac testing completed. If negative, we discussed still trialing a strict gluten-free diet x 1 month to see if pain still occurs.     Recommendations:  Get your labs checked to assess for gluten allergy.     Orders This Visit:  Orders Placed This Encounter   Procedures    Immunoglobulin A, Quant    Tissue Transglutaminase Ab, IgA       Meds This Visit:  Requested Prescriptions      No prescriptions requested or ordered in this encounter       Imaging & Referrals:  None       Maura Terrazas MD  [facility] Gastroenterology  3/21/2024

## 2024-04-02 ENCOUNTER — OFFICE VISIT (OUTPATIENT)
Dept: ALLERGY | Facility: CLINIC | Age: 44
End: 2024-04-02
Payer: COMMERCIAL

## 2024-04-02 ENCOUNTER — LAB ENCOUNTER (OUTPATIENT)
Dept: LAB | Age: 44
End: 2024-04-02
Attending: INTERNAL MEDICINE
Payer: COMMERCIAL

## 2024-04-02 ENCOUNTER — NURSE ONLY (OUTPATIENT)
Dept: ALLERGY | Facility: CLINIC | Age: 44
End: 2024-04-02

## 2024-04-02 VITALS
TEMPERATURE: 97 F | HEART RATE: 99 BPM | OXYGEN SATURATION: 97 % | SYSTOLIC BLOOD PRESSURE: 117 MMHG | DIASTOLIC BLOOD PRESSURE: 81 MMHG | RESPIRATION RATE: 20 BRPM

## 2024-04-02 DIAGNOSIS — L50.8 CHRONIC URTICARIA: ICD-10-CM

## 2024-04-02 DIAGNOSIS — Z23 FLU VACCINE NEED: ICD-10-CM

## 2024-04-02 DIAGNOSIS — Z23 NEED FOR COVID-19 VACCINE: ICD-10-CM

## 2024-04-02 DIAGNOSIS — J30.89 ENVIRONMENTAL AND SEASONAL ALLERGIES: ICD-10-CM

## 2024-04-02 DIAGNOSIS — Z91.018 FOOD ALLERGY: Primary | ICD-10-CM

## 2024-04-02 DIAGNOSIS — Z91.018 FOOD ALLERGY: ICD-10-CM

## 2024-04-02 DIAGNOSIS — T78.3XXA ANGIOEDEMA, INITIAL ENCOUNTER: Primary | ICD-10-CM

## 2024-04-02 DIAGNOSIS — R10.13 EPIGASTRIC PAIN: ICD-10-CM

## 2024-04-02 LAB — IGA SERPL-MCNC: 190.2 MG/DL (ref 40–350)

## 2024-04-02 PROCEDURE — 3074F SYST BP LT 130 MM HG: CPT | Performed by: ALLERGY & IMMUNOLOGY

## 2024-04-02 PROCEDURE — 95004 PERQ TESTS W/ALRGNC XTRCS: CPT | Performed by: ALLERGY & IMMUNOLOGY

## 2024-04-02 PROCEDURE — 86364 TISS TRNSGLTMNASE EA IG CLAS: CPT

## 2024-04-02 PROCEDURE — 99244 OFF/OP CNSLTJ NEW/EST MOD 40: CPT | Performed by: ALLERGY & IMMUNOLOGY

## 2024-04-02 PROCEDURE — 36415 COLL VENOUS BLD VENIPUNCTURE: CPT

## 2024-04-02 PROCEDURE — 82784 ASSAY IGA/IGD/IGG/IGM EACH: CPT

## 2024-04-02 PROCEDURE — 3079F DIAST BP 80-89 MM HG: CPT | Performed by: ALLERGY & IMMUNOLOGY

## 2024-04-02 RX ORDER — LEVOCETIRIZINE DIHYDROCHLORIDE 5 MG/1
5 TABLET, FILM COATED ORAL EVERY EVENING
Qty: 90 TABLET | Refills: 1 | Status: SHIPPED | OUTPATIENT
Start: 2024-04-02

## 2024-04-02 NOTE — PROGRESS NOTES
Eusebia Avendano is a 43 year old female.    HPI:     Chief Complaint   Patient presents with    Food Allergy     Consult.  Patient offers that she develops hives and edema of lips after eating salsa, wine (sulfates).  Patient states that she develops hives and angioedema without known food cause. She denies dyspnea is associated with episodes of hives.      Patient is a 43-year-old female who presents for allergy evaluation upon referral of her PCP dr gordon (9 [phone] so when I was younger    Review of records and immunizations notes no COVID vaccines on file.  No flu vaccine on file for the fall/winter    Today patient reports      Allergies?   Duration: years  Last test 30 yrs ago: sb, tomato , chocolate: tolerates now   Timing: intermittent   Symptoms: hives on legs , lip swelling, > sob   Denies oral swelling or resp issues   Severity: moderate  Prior ed or epi:  denies   Status: denies current symptoms   Triggers: salsa ? Wine? [place] within < 1 hr , milk)gi   Ok with seafood , almond milk   Tried:  claritin works well   Pets or smokers: 1 dog,   Former smoker   Freq: 1x/month , lasts < 1 hr   2023 tsh cbc cmp nl   Denies burning bruising skin changes scarring fevers joint pain or joint swelling with her hives    No ace-I       Celaic panel pending , see gi     Hx of asthma, ad, :  denies     Prior serum IgE testing in 2023 showed sensitization to corn 0.19, peanut 0.28, scallop 0.12, soy 0.18, walnut 0.13, wheat 0.22 with a total IgE level of 21.3. tolelrates       HISTORY:  Past Medical History:   Diagnosis Date    Decorative tattoo       Past Surgical History:   Procedure Laterality Date    CORRECTION HALLUX VALGUS Bilateral 2012    D & C  2013    INSERT INTRAUTERINE DEVICE            x2    REMOVE INTRAUTERINE DEVICE        Family History   Problem Relation Age of Onset    Diabetes Mother     Heart Disorder Neg     Breast Cancer Neg     Breast Cancer 2nd occurrence Neg      Ovarian Cancer Neg       Social History:   Social History     Socioeconomic History    Marital status:     Number of children: 2   Occupational History    Occupation: Homemaker   Tobacco Use    Smoking status: Former     Packs/day: .5     Types: Cigarettes     Quit date: 2014     Years since quittin.7     Passive exposure: Past    Smokeless tobacco: Former   Vaping Use    Vaping Use: Never used   Substance and Sexual Activity    Alcohol use: Yes     Comment: 2-3 times a week     Drug use: No    Sexual activity: Yes     Partners: Male        Medications (Active prior to today's visit):  Current Outpatient Medications   Medication Sig Dispense Refill    levocetirizine 5 MG Oral Tab Take 1 tablet (5 mg total) by mouth every evening. 90 tablet 1       Allergies:  Allergies   Allergen Reactions    Doxycycline RASH         ROS:     Allergic/Immuno:  See HPI  Cardiovascular:  Negative for irregular heartbeat/palpitations, chest pain, edema  Constitutional:  Negative night sweats,weight loss, irritability and lethargy  Endocrine:  Negative for cold intolerance, polydipsia and polyphagia  ENMT:  Negative for ear drainage, hearing loss and nasal drainage  Eyes:  Negative for eye discharge and vision loss  Gastrointestinal:  Negative for abdominal pain, diarrhea and vomiting  Genitourinary:  Negative for dysuria and hematuria  Hema/Lymph:  Negative for easy bleeding and easy bruising  Integumentary:   see hpi   Musculoskeletal:  Negative for joint symptoms  Neurological:  Negative for dizziness, seizures  Psychiatric:  Negative for inappropriate interaction and psychiatric symptoms  Respiratory:  Negative for cough, dyspnea and wheezing      PHYSICAL EXAM:   Constitutional: responsive, no acute distress noted  Head/Face: NC/Atraumatic  Eyes/Vision: conjunctiva and lids are normal extraocular motion is intact   Ears/Audiometry: tympanic membranes are normal bilaterally hearing is grossly  intact  Nose/Mouth/Throat: nose and throat are clear mucous membranes are moist   Neck/Thyroid: neck is supple without adenopathy  Lymphatic: no abnormal cervical, supraclavicular or axillary adenopathy is noted  Respiratory: normal to inspection lungs are clear to auscultation bilaterally normal respiratory effort   Cardiovascular: regular rate and rhythm no murmurs, gallups, or rubs  Abdomen: soft non-tender non-distended  Skin/Hair: no unusual rashes present  Extremities: no edema, cyanosis, or clubbing  Neurological:Oriented to time, place, person & situation       ASSESSMENT/PLAN:   Assessment   Encounter Diagnoses   Name Primary?    Need for COVID-19 vaccine     Flu vaccine need     Angioedema, initial encounter Yes    Chronic urticaria     Food allergy        Skin testing today to common indoor and outdoor environmental allergies was negative on scratch testing    Skin testing today to select foods including milk egg wheat soy almond walnut peanut wheat barley rye and hops was positive to wheat and negative to the remaining foods    Positive histamine control    #1 chronic urticaria  Handouts provided and reviewed including majority being idiopathic in nature.  Patient concern for allergic triggers.  See above skin testing.  Reviewed symptomatic care with antihistamines including Zyrtec or Xyzal once a day up to 4 times per day if needed  Patient reports concern for alcohol as potential trigger for intermittent hives and lip swelling.  Reviewed alcohol intolerance.  Please check [facility] website for alcohol intolerance for further information  If refractory antihistamines may consider Xolair as a treatment option.  Current frequency is approximately once per month.  Last less than 24 hours.    #2 angioedema.  Associated lip swelling.  TSH in November 2023 was normal.  See above #1.  Symptomatic care with antihistamines    #3Food allergies  See above skin testing to select foods to screen for allergic  triggers  Recommend to avoid any foods that are positive on skin testing  May consider introduction of foods that are negative on skin testing  Reviewed with patient that I do not have testing to sulfites  Reviewed I do not have testing to alcohol but can test to the grains including wheat barley rye and hops.  Please see above  Trial of avoidance of wheat to see if it helps with her symptoms including GI symptoms.  Celiac panel still pending from GI    #4 flu vaccine recommended in the fall    #5 COVID-vaccine booster recommended.  Reviewed I do not have the booster my office           Orders This Visit:  No orders of the defined types were placed in this encounter.      Meds This Visit:  Requested Prescriptions     Signed Prescriptions Disp Refills    levocetirizine 5 MG Oral Tab 90 tablet 1     Sig: Take 1 tablet (5 mg total) by mouth every evening.       Imaging & Referrals:  None     4/2/2024  David Okeefe MD      If medication samples were provided today, they were provided solely for patient education and training related to self administration of these medications.  Teaching, instruction and sample was provided to the patient by myself.  Teaching included  a review of potential adverse side effects as well as potential efficacy.  Patient's questions were answered in regards to medication administration and dosing and potential side effects. Teaching was provided via the teach back method

## 2024-04-02 NOTE — PATIENT INSTRUCTIONS
#1 chronic urticaria  Handouts provided and reviewed including majority being idiopathic in nature.  Patient concern for allergic triggers.  See above skin testing.  Reviewed symptomatic care with antihistamines including Zyrtec or Xyzal once a day up to 4 times per day if needed  Patient reports concern for alcohol as potential trigger for intermittent hives and lip swelling.  Reviewed alcohol intolerance.  Please check [facility] website for alcohol intolerance for further information  If refractory antihistamines may consider Xolair as a treatment option.  Current frequency is approximately once per month.  Last less than 24 hours.    #2 angioedema.  Associated lip swelling.  TSH in November 2023 was normal.  See above #1.  Symptomatic care with antihistamines    #3Food allergies  See above skin testing to select foods to screen for allergic triggers  Recommend to avoid any foods that are positive on skin testing  May consider introduction of foods that are negative on skin testing  Reviewed with patient that I do not have testing to sulfites  Reviewed I do not have testing to alcohol but can test to the grains including wheat barley rye and hops.  Please see above    #4 flu vaccine recommended in the fall    #5 COVID-vaccine booster recommended.  Reviewed I do not have the booster my office           Orders This Visit:  No orders of the defined types were placed in this encounter.      Meds This Visit:  Requested Prescriptions     Signed Prescriptions Disp Refills    levocetirizine 5 MG Oral Tab 90 tablet 1     Sig: Take 1 tablet (5 mg total) by mouth every evening.

## 2024-04-03 LAB — TTG IGA SER-ACNC: 0.4 U/ML (ref ?–7)

## 2024-04-03 NOTE — PROGRESS NOTES
Reviewed. Will still trial GFD x 1 month as pt feels she has a gluten intolerance. If sx still occur without gluten, will consider RUQ US vs PPI trial vs EGD.

## 2024-07-30 ENCOUNTER — OFFICE VISIT (OUTPATIENT)
Dept: OTOLARYNGOLOGY | Facility: CLINIC | Age: 44
End: 2024-07-30
Payer: COMMERCIAL

## 2024-07-30 DIAGNOSIS — H93.90 EAR LESION: Primary | ICD-10-CM

## 2024-07-30 PROCEDURE — 99203 OFFICE O/P NEW LOW 30 MIN: CPT | Performed by: STUDENT IN AN ORGANIZED HEALTH CARE EDUCATION/TRAINING PROGRAM

## 2024-07-30 RX ORDER — MOMETASONE FUROATE 1 MG/G
1 OINTMENT TOPICAL DAILY
Qty: 1 EACH | Refills: 0 | Status: SHIPPED | OUTPATIENT
Start: 2024-07-30

## 2024-07-30 NOTE — PROGRESS NOTES
Eusebia Avendano is a 43 year old female.   Chief Complaint   Patient presents with    Ear Problem     Has a small growth inside left ear, has been for months now, pain when touched     HPI:   43-year-old presents with an intermittently tender lesion of her left entrance of the ear canal    Current Outpatient Medications   Medication Sig Dispense Refill    mometasone 0.1 % External Ointment Apply 1 Application topically daily. 1 each 0    levocetirizine 5 MG Oral Tab Take 1 tablet (5 mg total) by mouth every evening. 90 tablet 1      Past Medical History:    Decorative tattoo      Social History:  Social History     Socioeconomic History    Marital status:     Number of children: 2   Occupational History    Occupation: Homemaker   Tobacco Use    Smoking status: Former     Current packs/day: 0.00     Types: Cigarettes     Quit date: 2014     Years since quitting: 10.0     Passive exposure: Past    Smokeless tobacco: Former   Vaping Use    Vaping status: Never Used   Substance and Sexual Activity    Alcohol use: Yes     Comment: 2-3 times a week     Drug use: No    Sexual activity: Yes     Partners: Male      Past Surgical History:   Procedure Laterality Date    Correction hallux valgus Bilateral 2012    D & c  2013    Insert intrauterine device            x2    Remove intrauterine device           EXAM:   LMP 2024 (Exact Date)     System Details   Skin Inspection - Normal.   Constitutional Overall appearance - Normal.   Head/Face Symmetric, TMJ tenderness not present    Eyes EOMI, PERRL   Right ear:  Canal clear, TM intact, no [place]   Left ear:  Canal clear, TM intact, no [place]  At the anterior aspect of the entrance of her external auditory canal there is a small smooth raised cyst light growth   Nose: Septum midline, inferior turbinates not enlarged, nasal valves without collapse    Oral cavity/Oropharynx: No lesions or masses on inspection or palpation, tonsils symmetric    Neck:  Soft without LAD, thyroid not enlarged  Voice clear/ no stridor   Other:      SCOPES AND PROCEDURES:         AUDIOGRAM AND IMAGING:         IMPRESSION:   1. Ear lesion       Recommendations:  -Likely has a small cyst or cutaneous growth of her external auditory canal entrance on the left ear  -Will try a topical steroid first to calm down any inflammation regarding this lesion  -Discussed removing the lesion under local anesthesia if she desires she may come back to have this performed    The patient indicates understanding of these issues and agrees to the plan.      Renny Cervantes MD  7/30/2024  2:55 PM

## 2025-01-24 ENCOUNTER — OFFICE VISIT (OUTPATIENT)
Dept: OBGYN CLINIC | Facility: CLINIC | Age: 45
End: 2025-01-24
Payer: COMMERCIAL

## 2025-01-24 ENCOUNTER — LAB ENCOUNTER (OUTPATIENT)
Dept: LAB | Facility: HOSPITAL | Age: 45
End: 2025-01-24
Attending: NURSE PRACTITIONER
Payer: COMMERCIAL

## 2025-01-24 VITALS
SYSTOLIC BLOOD PRESSURE: 133 MMHG | DIASTOLIC BLOOD PRESSURE: 92 MMHG | BODY MASS INDEX: 21 KG/M2 | HEART RATE: 89 BPM | WEIGHT: 137.19 LBS

## 2025-01-24 DIAGNOSIS — Z12.4 SCREENING FOR CERVICAL CANCER: ICD-10-CM

## 2025-01-24 DIAGNOSIS — N93.9 ABNORMAL UTERINE BLEEDING: ICD-10-CM

## 2025-01-24 DIAGNOSIS — Z12.31 ENCOUNTER FOR SCREENING MAMMOGRAM FOR MALIGNANT NEOPLASM OF BREAST: ICD-10-CM

## 2025-01-24 DIAGNOSIS — Z01.419 WELL WOMAN EXAM WITH ROUTINE GYNECOLOGICAL EXAM: Primary | ICD-10-CM

## 2025-01-24 LAB
B-HCG SERPL-ACNC: <2.6 MIU/ML
DEPRECATED RDW RBC AUTO: 43.3 FL (ref 35.1–46.3)
ERYTHROCYTE [DISTWIDTH] IN BLOOD BY AUTOMATED COUNT: 12 % (ref 11–15)
HCT VFR BLD AUTO: 38.7 %
HGB BLD-MCNC: 13.4 G/DL
MCH RBC QN AUTO: 33.8 PG (ref 26–34)
MCHC RBC AUTO-ENTMCNC: 34.6 G/DL (ref 31–37)
MCV RBC AUTO: 97.5 FL
PLATELET # BLD AUTO: 239 10(3)UL (ref 150–450)
RBC # BLD AUTO: 3.97 X10(6)UL
TSI SER-ACNC: 1.48 UIU/ML (ref 0.55–4.78)
WBC # BLD AUTO: 7.4 X10(3) UL (ref 4–11)

## 2025-01-24 PROCEDURE — 85027 COMPLETE CBC AUTOMATED: CPT

## 2025-01-24 PROCEDURE — 84443 ASSAY THYROID STIM HORMONE: CPT | Performed by: NURSE PRACTITIONER

## 2025-01-24 PROCEDURE — 84702 CHORIONIC GONADOTROPIN TEST: CPT

## 2025-01-24 PROCEDURE — 3075F SYST BP GE 130 - 139MM HG: CPT | Performed by: NURSE PRACTITIONER

## 2025-01-24 PROCEDURE — 36415 COLL VENOUS BLD VENIPUNCTURE: CPT | Performed by: NURSE PRACTITIONER

## 2025-01-24 PROCEDURE — 99396 PREV VISIT EST AGE 40-64: CPT | Performed by: NURSE PRACTITIONER

## 2025-01-24 PROCEDURE — 3080F DIAST BP >= 90 MM HG: CPT | Performed by: NURSE PRACTITIONER

## 2025-01-24 NOTE — PROGRESS NOTES
[facility]    Obstetrics and Gynecology    Chief Complaint   Patient presents with    Gyn Exam     Annual exam Reviewed Preventative/Wellness form with patient.        Eusebia Avendano is a 44 year old female  Patient's last menstrual period was 2025 (exact date). presenting for annual gynecology exam.  Last seen 2024  Since last year periods coming more frequently and lasting longer - last period lasted 10 days. Prior period was 2024 and then 12/15-.  2 days very heavy  - changes super plus tampon and has to iverson every 1-1.5 hours.    Increased cramping with periods also needing to take aleeve for pain- cramping improves. Did try mirena for 2 years and helped with periods but had it removed due to concerns for adverse reactions she was hearing about.  No constipation, no painful bowel movements.     , using vasectomy. No pain or bleeding. No abnormal discharge.     Recent labs in 2023 normal with PCP.     Pap:2022 NILM, neg HPV; due in   No history of abnormal paps  Contraception:vasectomy  Mammo: 2024 normal  Colonoscopy: n/a      OBSTETRICS HISTORY:  OB History    Para Term  AB Living   3 2 2 0 1 2   SAB IAB Ectopic Multiple Live Births   1 0 0 0 2       GYNE HISTORY:  Menarche: 13 (2025  1:07 PM)  Period Cycle (Days): irregular every 16 days per pt (2025  1:07 PM)  Period Duration (Days): 10 days (2025  1:07 PM)  Period Flow: 2 days heavy then light (2025  1:07 PM)  Use of Birth Control (if yes, specify type): Vasectomy (2025  1:07 PM)  Pap Date: 22 (2025  1:07 PM)  Pap Result Notes: neg pap / neg hpv// mammo 24 diag c1-neg (2025  1:07 PM)  Follow Up Recommendation: last annual 24 emb (2025  1:07 PM)      History   Sexual Activity    Sexual activity: Yes    Partners: Male           Latest Ref Rng & Units 2022    11:49 AM   RECENT PAP RESULTS   INTERPRETATION/RESULT: Negative for  intraepithelial lesion or malignancy Negative for intraepithelial lesion or malignancy    HPV Negative Negative          MEDICAL HISTORY:  Past Medical History:    Decorative tattoo     Past Surgical History:   Procedure Laterality Date    Correction hallux valgus Bilateral 2012    D & c  2013    Eye surgery      PRK surgery of both eyes for vision correction    Insert intrauterine device            x2    Remove intrauterine device         SOCIAL HISTORY:  Social History     Socioeconomic History    Marital status:      Spouse name: Not on file    Number of children: 2    Years of education: Not on file    Highest education level: Not on file   Occupational History    Occupation: Homemaker   Tobacco Use    Smoking status: Former     Current packs/day: 0.00     Types: Cigarettes     Quit date: 2014     Years since quitting: 10.5     Passive exposure: Past    Smokeless tobacco: Former   Vaping Use    Vaping status: Never Used   Substance and Sexual Activity    Alcohol use: Yes     Comment: 2-3 times a week     Drug use: No    Sexual activity: Yes     Partners: Male   Other Topics Concern    Not on file   Social History Narrative    Not on file     Social Drivers of Health     Financial Resource Strain: Not on file   Food Insecurity: Not on file   Transportation Needs: Not on file   Physical Activity: Not on file   Stress: Not on file   Social Connections: Not on file   Housing Stability: Not on file         Depression Screening (PHQ-2/PHQ-9): Over the LAST 2 WEEKS   Little interest or pleasure in doing things (over the last two weeks)?: Not at all    Feeling down, depressed, or hopeless (over the last two weeks)?: Not at all    PHQ-2 SCORE: 0           FAMILY HISTORY:  Family History   Problem Relation Age of Onset    Diabetes Mother     Heart Disorder Neg     Breast Cancer Neg     Breast Cancer 2nd occurrence Neg     Ovarian Cancer Neg        MEDICATIONS:  No current outpatient medications  on file.    ALLERGIES:  Allergies[1]      Review of Systems:  Constitutional:  Denies fatigue, night sweats, hot flashes  Eyes:  denies blurred or double vision  Cardiovascular:  denies chest pain or palpitations  Respiratory:  denies shortness of breath  Gastrointestinal:  denies heartburn, abdominal pain, diarrhea or constipation  Genitourinary:  denies dysuria, incontinence, abnormal vaginal discharge, vaginal itching, +abnormal bleeding, +dysmenorrhea  Musculoskeletal:  denies back pain   Skin/Breast:  Denies any breast pain, lumps, or discharge.   Neurological:  denies headaches, extremity weakness or numbness.  Psychiatric: denies depression or anxiety.  Endocrine:   denies excessive thirst or urination.  Heme/Lymph:  denies history of anemia, easy bruising or bleeding.      PHYSICAL EXAM:     Vitals:    01/24/25 1309   BP: (!) 133/92   Pulse: 89   Weight: 137 lb 3.2 oz (62.2 kg)       Body mass index is 20.86 kg/m².     Patient offered chaperone, patient declined    Constitutional: well developed, well nourished  Psychiatric:  Oriented to time, place, person and situation. Appropriate mood and affect  Head/Face: normocephalic  Neck/Thyroid: thyroid symmetric, no thyromegaly, no nodules, no adenopathy  Lymphatic:no abnormal supraclavicular or axillary adenopathy is noted  Breast: normal without palpable masses, tenderness, asymmetry, nipple discharge, nipple retraction or skin changes  Abdomen:  soft, nontender, nondistended, no masses  Skin/Hair: no unusual rashes or bruises  Extremities: no edema, no cyanosis    Pelvic Exam:  External Genitalia: normal appearance, hair distribution, and no lesions  Urethral Meatus:  normal in size, location, without lesions and prolapse  Bladder:  No fullness, masses or tenderness  Vagina:  Normal appearance without lesions, no abnormal discharge  Cervix:  Normal without tenderness on motion  Uterus: normal in size, contour, position, mobility, without tenderness  Adnexa:  normal without masses or tenderness  Perineum: normal  Anus: no hemorroids     Assessment & Plan:    ICD-10-CM    1. Well woman exam with routine gynecological exam  Z01.419       2. Screening for cervical cancer  Z12.4 ThinPrep PAP Smear     Hpv Dna  High Risk , Thin Prep Collect     Hpv Dna  High Risk , Thin Prep Collect     ThinPrep PAP Smear      3. Encounter for screening mammogram for malignant neoplasm of breast  Z12.31 REGINA ANGELO 2D+3D SCREENING BILAT (CPT=[id number])      4. Abnormal uterine bleeding  N93.9 HCG, Beta Subunit (Quant Pregnancy Test)     TSH W Reflex To Free T4     CBC, Platelet; No Differential     US PELVIS W EV (JWA=[id number])         Reviewed ASCCP guidelines with the patient   Pap done today  Contraception: Using vasectomy  Abnormal bleeding - labs and pelvic ultrasound. Reviewed reasons for aub. Rto after ultrasound for endometrial biopsy. Patient open to using birth control to regulate and help with menstrual symptoms.   Breast Health:     Reviewed current guidelines with the patient and to start Mammograms at age 40  Reviewed monthly self breast exams with the patient   Discussed diet, exercise, MVIs with Ca/Vit D  Follow up in 1 yr for WWE    YOUSUF Prado    This note was prepared using Dragon Medical voice recognition dictation software. As a result errors may occur. When identified these errors have been corrected. While every attempt is made to correct errors during dictation discrepancies may still exist.         [1]   Allergies  Allergen Reactions    Doxycycline RASH

## 2025-01-27 LAB — HPV E6+E7 MRNA CVX QL NAA+PROBE: NEGATIVE

## 2025-02-21 ENCOUNTER — OFFICE VISIT (OUTPATIENT)
Dept: FAMILY MEDICINE CLINIC | Facility: CLINIC | Age: 45
End: 2025-02-21
Payer: COMMERCIAL

## 2025-02-21 VITALS
WEIGHT: 136 LBS | DIASTOLIC BLOOD PRESSURE: 75 MMHG | HEART RATE: 71 BPM | SYSTOLIC BLOOD PRESSURE: 112 MMHG | HEIGHT: 68 IN | OXYGEN SATURATION: 98 % | BODY MASS INDEX: 20.61 KG/M2

## 2025-02-21 DIAGNOSIS — Z00.00 ROUTINE GENERAL MEDICAL EXAMINATION AT A HEALTH CARE FACILITY: Primary | ICD-10-CM

## 2025-02-21 DIAGNOSIS — K21.9 GASTROESOPHAGEAL REFLUX DISEASE, UNSPECIFIED WHETHER ESOPHAGITIS PRESENT: ICD-10-CM

## 2025-02-21 DIAGNOSIS — Z12.11 COLON CANCER SCREENING: ICD-10-CM

## 2025-02-21 DIAGNOSIS — I77.6 VASCULITIS: ICD-10-CM

## 2025-02-21 PROBLEM — Z86.16 HISTORY OF COVID-19: Status: RESOLVED | Noted: 2021-09-10 | Resolved: 2025-02-21

## 2025-02-21 RX ORDER — OMEPRAZOLE 40 MG/1
40 CAPSULE, DELAYED RELEASE ORAL DAILY
Qty: 30 CAPSULE | Refills: 1 | Status: SHIPPED | OUTPATIENT
Start: 2025-02-21 | End: 2026-02-16

## 2025-02-21 NOTE — PROGRESS NOTES
Subjective:   Eusebia Avendano is a 44 year old female who presents for Physical       History/Other:    Chief Complaint Reviewed and Verified  No Further Nursing Notes to   Review  Tobacco Reviewed  Allergies Reviewed  Medications Reviewed    Problem List Reviewed  Medical History Reviewed  Surgical History   Reviewed  OB Status Reviewed  Family History Reviewed  Social History   Reviewed         Tobacco:  She smoked tobacco in the past but quit greater than 12 months ago.  Social History     Tobacco Use   Smoking Status Former    Current packs/day: 0.00    Types: Cigarettes    Quit date: 7/1/2014    Years since quitting: 10.6    Passive exposure: Past   Smokeless Tobacco Former        Current Outpatient Medications   Medication Sig Dispense Refill    Omeprazole 40 MG Oral Capsule Delayed Release Take 1 capsule (40 mg total) by mouth daily. 30 capsule 1         Review of Systems:  Review of Systems   Constitutional: Negative.    HENT: Negative.     Eyes: Negative.    Respiratory: Negative.     Cardiovascular: Negative.    Gastrointestinal:  Positive for abdominal pain.   Genitourinary: Negative.    Musculoskeletal: Negative.    Skin:  Positive for color change.        Fingers accompanied by pain   Neurological: Negative.    Psychiatric/Behavioral: Negative.  Negative for sleep disturbance and suicidal ideas. The patient is not nervous/anxious.          Objective:   /75   Pulse 71   Ht 5' 8\" (1.727 m)   Wt 136 lb (61.7 kg)   LMP 01/29/2025 (Exact Date)   SpO2 98%   BMI 20.68 kg/m²  Estimated body mass index is 20.68 kg/m² as calculated from the following:    Height as of this encounter: 5' 8\" (1.727 m).    Weight as of this encounter: 136 lb (61.7 kg).  Physical Exam  Vitals reviewed.   Constitutional:       Appearance: She is well-developed.   HENT:      Head: Normocephalic.      Right Ear: Hearing, tympanic membrane, ear canal and external ear normal.      Left Ear: Hearing, tympanic  membrane, ear canal and external ear normal.      Nose: Nose normal.   Eyes:      General: Lids are normal.      Conjunctiva/sclera: Conjunctivae normal.      Pupils: Pupils are equal, round, and reactive to light.      Funduscopic exam:     Right eye: No hemorrhage or papilledema.         Left eye: No hemorrhage or papilledema.   Neck:      Thyroid: No thyroid mass or thyromegaly.   Cardiovascular:      Pulses:           Radial pulses are 2+ on the right side and 2+ on the left side.      Heart sounds: Normal heart sounds.   Pulmonary:      Effort: Pulmonary effort is normal.      Breath sounds: Normal breath sounds.   Abdominal:      Palpations: There is no hepatomegaly or splenomegaly.      Tenderness: There is no abdominal tenderness.   Musculoskeletal:      Cervical back: Neck supple.      Lumbar back: Normal.   Lymphadenopathy:      Cervical: No cervical adenopathy.   Skin:     Comments: No suspicious lesions above the waist exam   Neurological:      Mental Status: She is alert and oriented to person, place, and time.      Sensory: No sensory deficit.      Deep Tendon Reflexes: Reflexes are normal and symmetric.   Psychiatric:         Mood and Affect: Mood is not anxious or depressed.           Assessment & Plan:   1. Routine general medical examination at a health care facility (Primary)  -     Comp Metabolic Panel (14); Future; Expected date: 02/21/2025  -     Lipid Panel; Future; Expected date: 02/21/2025    2. Vasculitis  -     Connective Tissue Disease (DOREEN) Screen; Future; Expected date: 02/21/2025  -     Sed Jose Fregoso (Automated); Future; Expected date: 02/21/2025  -     Rheumatology Referral  She shares a picture of segment of finger, blue discoloration.  This will last for 1-2 weeks and be accompanied by pain.  Temp changes does not seem to affect.  Winter and summer.    Get labs.  Concerned for possible type of vasculitis.      3. Colon cancer screening    4. Gastroesophageal reflux disease,  unspecified whether esophagitis present  -     GASTRO - INTERNAL  History of GERD like symptoms not treated in past.  Trial of treatment.     Other orders  -     Omeprazole; Take 1 capsule (40 mg total) by mouth daily.  Dispense: 30 capsule; Refill: 1        No follow-ups on file.    Dalton Walsh DO, 2/21/2025, 2:42 PM

## 2025-02-22 ENCOUNTER — LAB ENCOUNTER (OUTPATIENT)
Dept: LAB | Age: 45
End: 2025-02-22
Attending: FAMILY MEDICINE
Payer: COMMERCIAL

## 2025-02-22 DIAGNOSIS — Z00.00 ROUTINE GENERAL MEDICAL EXAMINATION AT A HEALTH CARE FACILITY: ICD-10-CM

## 2025-02-22 DIAGNOSIS — I77.6 VASCULITIS: ICD-10-CM

## 2025-02-22 LAB
ALBUMIN SERPL-MCNC: 4.6 G/DL (ref 3.2–4.8)
ALBUMIN/GLOB SERPL: 1.8 {RATIO} (ref 1–2)
ALP LIVER SERPL-CCNC: 48 U/L
ALT SERPL-CCNC: 11 U/L
ANION GAP SERPL CALC-SCNC: 7 MMOL/L (ref 0–18)
AST SERPL-CCNC: 18 U/L (ref ?–34)
BILIRUB SERPL-MCNC: 1.5 MG/DL (ref 0.3–1.2)
BUN BLD-MCNC: 12 MG/DL (ref 9–23)
BUN/CREAT SERPL: 13.6 (ref 10–20)
CALCIUM BLD-MCNC: 9.5 MG/DL (ref 8.7–10.4)
CHLORIDE SERPL-SCNC: 104 MMOL/L (ref 98–112)
CHOLEST SERPL-MCNC: 218 MG/DL (ref ?–200)
CO2 SERPL-SCNC: 29 MMOL/L (ref 21–32)
CREAT BLD-MCNC: 0.88 MG/DL
EGFRCR SERPLBLD CKD-EPI 2021: 83 ML/MIN/1.73M2 (ref 60–?)
ERYTHROCYTE [SEDIMENTATION RATE] IN BLOOD: 7 MM/HR
FASTING PATIENT LIPID ANSWER: YES
FASTING STATUS PATIENT QL REPORTED: YES
GLOBULIN PLAS-MCNC: 2.5 G/DL (ref 2–3.5)
GLUCOSE BLD-MCNC: 88 MG/DL (ref 70–99)
HDLC SERPL-MCNC: 89 MG/DL (ref 40–59)
LDLC SERPL CALC-MCNC: 120 MG/DL (ref ?–100)
NONHDLC SERPL-MCNC: 129 MG/DL (ref ?–130)
OSMOLALITY SERPL CALC.SUM OF ELEC: 289 MOSM/KG (ref 275–295)
POTASSIUM SERPL-SCNC: 4.4 MMOL/L (ref 3.5–5.1)
PROT SERPL-MCNC: 7.1 G/DL (ref 5.7–8.2)
SODIUM SERPL-SCNC: 140 MMOL/L (ref 136–145)
TRIGL SERPL-MCNC: 50 MG/DL (ref 30–149)
VLDLC SERPL CALC-MCNC: 9 MG/DL (ref 0–30)

## 2025-02-22 PROCEDURE — 80061 LIPID PANEL: CPT

## 2025-02-22 PROCEDURE — 85652 RBC SED RATE AUTOMATED: CPT

## 2025-02-22 PROCEDURE — 86225 DNA ANTIBODY NATIVE: CPT

## 2025-02-22 PROCEDURE — 80053 COMPREHEN METABOLIC PANEL: CPT

## 2025-02-22 PROCEDURE — 36415 COLL VENOUS BLD VENIPUNCTURE: CPT

## 2025-02-22 PROCEDURE — 86038 ANTINUCLEAR ANTIBODIES: CPT

## 2025-02-25 LAB
DSDNA IGG SERPL IA-ACNC: 1.9 IU/ML
ENA AB SER QL IA: 0.3 UG/L
ENA AB SER QL IA: NEGATIVE

## 2025-02-26 ENCOUNTER — HOSPITAL ENCOUNTER (OUTPATIENT)
Dept: ULTRASOUND IMAGING | Age: 45
Discharge: HOME | End: 2025-02-26
Attending: NURSE PRACTITIONER
Payer: COMMERCIAL

## 2025-02-26 DIAGNOSIS — N93.9 ABNORMAL UTERINE BLEEDING: ICD-10-CM

## 2025-02-26 PROCEDURE — 76830 TRANSVAGINAL US NON-OB: CPT | Performed by: NURSE PRACTITIONER

## 2025-02-26 PROCEDURE — 76856 US EXAM PELVIC COMPLETE: CPT | Performed by: NURSE PRACTITIONER

## 2025-02-28 ENCOUNTER — HOSPITAL ENCOUNTER (OUTPATIENT)
Dept: MAMMOGRAPHY | Age: 45
Discharge: HOME | End: 2025-02-28
Attending: NURSE PRACTITIONER
Payer: COMMERCIAL

## 2025-02-28 DIAGNOSIS — Z12.31 ENCOUNTER FOR SCREENING MAMMOGRAM FOR MALIGNANT NEOPLASM OF BREAST: ICD-10-CM

## 2025-02-28 PROCEDURE — 77067 SCR MAMMO BI INCL CAD: CPT | Performed by: NURSE PRACTITIONER

## 2025-02-28 PROCEDURE — 77063 BREAST TOMOSYNTHESIS BI: CPT | Performed by: NURSE PRACTITIONER

## 2025-03-03 ENCOUNTER — TELEPHONE (OUTPATIENT)
Dept: OBGYN CLINIC | Facility: CLINIC | Age: 45
End: 2025-03-03

## 2025-03-03 NOTE — TELEPHONE ENCOUNTER
Patient states that she recently had a mammogram and ultrasound test done, and the physician requested to see her sooner then first available. Should the patient be added to the schedule? First available is not until the end of March and the patient will be out of town.

## 2025-03-03 NOTE — TELEPHONE ENCOUNTER
Pt saw EMB for annual on 1/24/25 and also discussed AUB. Notes from visit state:    Abnormal bleeding - labs and pelvic ultrasound. Reviewed reasons for aub. Rto after ultrasound for endometrial biopsy. Patient open to using birth control to regulate and help with menstrual symptoms.   Pt is calling to schedule f/u appt with EMB. Pt scheduled appt for 3/7. Pt then stated that she does not want to go through an endometrial biopsy, so does not want this done at this upcoming apt. Explained to pt that the purpose of the in office f/u was for the EMBX and message will be sent to EMB if pt needs f/u or if OK to start BC to regulate periods?

## 2025-03-03 NOTE — TELEPHONE ENCOUNTER
I would recommend endometrial biopsy due to her bleeding discussed at last visit. I can discuss this recommendation with her at her next visit, would not be comfortable starting birth control without endometrial biopsy. We can discuss risk benefits and other options at next visit.

## 2025-03-07 ENCOUNTER — OFFICE VISIT (OUTPATIENT)
Dept: OBGYN CLINIC | Facility: CLINIC | Age: 45
End: 2025-03-07

## 2025-03-07 VITALS
HEART RATE: 87 BPM | SYSTOLIC BLOOD PRESSURE: 111 MMHG | BODY MASS INDEX: 21 KG/M2 | DIASTOLIC BLOOD PRESSURE: 76 MMHG | WEIGHT: 136 LBS

## 2025-03-07 DIAGNOSIS — N93.9 ABNORMAL UTERINE BLEEDING: Primary | ICD-10-CM

## 2025-03-07 PROCEDURE — 3078F DIAST BP <80 MM HG: CPT | Performed by: NURSE PRACTITIONER

## 2025-03-07 PROCEDURE — 99213 OFFICE O/P EST LOW 20 MIN: CPT | Performed by: NURSE PRACTITIONER

## 2025-03-07 PROCEDURE — 3074F SYST BP LT 130 MM HG: CPT | Performed by: NURSE PRACTITIONER

## 2025-03-07 NOTE — PROGRESS NOTES
[facility]   Obstetrics and Gynecology    Eusebia Avendano is a 44 year old female  Patient's last menstrual period was 2025 (approximate).   Chief Complaint   Patient presents with    Follow - Up     DISCUSS GETTING ON MEDICATION    .   Last seen  for annual and c/o heavy and frequent/prolonged periods. Since that visit she reports having a had period in February for 11 days then break for 11 days then menses started again for 11 days.    Discussed pelvic ultrasound results and lab results with patient. Recommend endometrial biopsy due to age and abnormal bleeding. Reviewed causes of abnormal uterine bleeding and evaluation of endometrium prior to being able to offer treatment options.  Reviewed procedure with patient.    Patient agreeable to endometrial biopsy after further discussion. RTO for endometrial biopsy  Advised ibuprofen prior to visit.    Pap:2025 NILM, negative HPV  Contraception: vasectomy    Study Result    Narrative   PROCEDURE: US PELVIS W EV (CPT=76856/83053)     COMPARISON: None.     INDICATIONS: Abnormal uterine bleeding     TECHNIQUE: Pelvic ultrasound using transabdominal and transvaginal technique.  A transvaginal scan was performed for endometrial and adnexal evaluation     FINDINGS:  UTERUS:   Measures 7.9 x 5.1 x 6.0 cm     ENDOMETRIUM: Endometrial thickness 4.2 mm.  No fluid or mass in the endometrial canal.  Punctate dystrophic calcification at the endometrial margin on the cervical region  MYOMETRIUM: Isoechoic uterine leiomyoma anterior uterine body left side measures 1.2 x 1.0 x 1.1 cm.  Smaller posterior hypoechoic leiomyoma within the uterine body measuring 0.5 x 0.4 cm.     OVARIES AND ADNEXA:     RIGHT:   Measures 3.8 x 2.8 x 3.3 cm.  Dominant benign follicular cyst measures 3.1 x 2.6 cm  LEFT:   Measures 3.3 x 1.8 x 2.7 cm.  Left para ovarian tubular structure measuring 2.0 x 0.6 cm suspicious for hydrosalpinx     CUL-DE-SAC:   Amount of free fluid  within the cul-de-sac  OTHER: Negative.  Bladder appears normal.           Impression   CONCLUSION:  1. Two small uterine leiomyomas visualized.    2. Normal thickness endometrium measures 4.2 mm.  Small punctate dystrophic calcification adjacent to the endometrium in the cervical region of doubtful significance.  3. Normal bilateral ovaries.  Dominant benign follicular cyst right ovary.  Left para ovarian tubular structure probably benign appearing hydrosalpinx .  Small amount of free fluid the cul-de-sac           Dictated by (CST): Julius Og MD on 3/04/2025 at 12:42 PM      Finalized by (CST): Julius Og MD on 3/04/2025 at 12:58 PM       OBSTETRICS HISTORY:  OB History    Para Term  AB Living   3 2 2 0 1 2   SAB IAB Ectopic Multiple Live Births   1 0 0 0 2       GYNE HISTORY:  Menarche: 13 (3/7/2025  1:19 PM)  Period Cycle (Days): irregular every 16 days per pt (3/7/2025  1:19 PM)  Period Duration (Days): 10 days (3/7/2025  1:19 PM)  Period Flow: 2 days heavy then light (3/7/2025  1:19 PM)  Use of Birth Control (if yes, specify type): Vasectomy (3/7/2025  1:19 PM)  Pap Date: 25 (3/7/2025  1:19 PM)  Pap Result Notes: neg pap / neg hpv (3/7/2025  1:19 PM)  Follow Up Recommendation: MAMMO 25 LEEANNE NEG (3/7/2025  1:19 PM)      History   Sexual Activity    Sexual activity: Yes    Partners: Male       MEDICAL HISTORY:  Past Medical History:    Decorative tattoo       SOCIAL HISTORY:  Social History     Socioeconomic History    Marital status:      Spouse name: Not on file    Number of children: 2    Years of education: Not on file    Highest education level: Not on file   Occupational History    Occupation: Homemaker   Tobacco Use    Smoking status: Former     Current packs/day: 0.00     Types: Cigarettes     Quit date: 2014     Years since quitting: 10.6     Passive exposure: Past    Smokeless tobacco: Former   Vaping Use    Vaping status: Never Used   Substance and  Sexual Activity    Alcohol use: Yes     Comment: 2-3 times a week     Drug use: No    Sexual activity: Yes     Partners: Male   Other Topics Concern    Not on file   Social History Narrative    Not on file     Social Drivers of Health     Food Insecurity: Not on file   Transportation Needs: Not on file   Stress: Not on file   Housing Stability: Not on file       MEDICATIONS:    Current Outpatient Medications:     Omeprazole 40 MG Oral Capsule Delayed Release, Take 1 capsule (40 mg total) by mouth daily., Disp: 30 capsule, Rfl: 1    ALLERGIES:  Allergies[1]      Review of Systems:  Constitutional:  Denies fatigue, night sweats, hot flashes  Cardiovascular:  denies chest pain or palpitations  Respiratory:  denies shortness of breath  Gastrointestinal:  denies heartburn, abdominal pain, diarrhea or constipation  Genitourinary:  denies dysuria, incontinence, abnormal vaginal discharge, vaginal itching +abnormal uterine bleeding  Musculoskeletal:  denies back pain.  Skin/Breast:  Denies any breast pain, lumps, or discharge.   Neurological:  denies headaches, extremity weakness or numbness.  Psychiatric: denies depression or anxiety.  Endocrine:   denies excessive thirst or urination.  Heme/Lymph:  denies history of anemia, easy bruising or bleeding.      PHYSICAL EXAM:     Vitals:    03/07/25 1326   BP: 111/76   Pulse: 87   Weight: 136 lb (61.7 kg)     Body mass index is 20.68 kg/m².     Patient offered chaperone, patient declined    Constitutional: well developed, well nourished    Psychiatric:  Oriented to time, place, person and situation. Appropriate mood and affect      Assessment & Plan:  Eusebia was seen today for follow - up.    Diagnoses and all orders for this visit:    Abnormal uterine bleeding    Patient will rto for endometrial biopsy       YOUSUF Prado    This note was prepared using Dragon Medical voice recognition dictation software. As a result errors may occur. When identified these errors have  been corrected. While every attempt is made to correct errors during dictation discrepancies may still exist.         [1]   Allergies  Allergen Reactions    Doxycycline RASH

## 2025-03-14 ENCOUNTER — OFFICE VISIT (OUTPATIENT)
Dept: OBGYN CLINIC | Facility: CLINIC | Age: 45
End: 2025-03-14

## 2025-03-14 VITALS
HEART RATE: 76 BPM | WEIGHT: 137.81 LBS | SYSTOLIC BLOOD PRESSURE: 112 MMHG | BODY MASS INDEX: 21 KG/M2 | DIASTOLIC BLOOD PRESSURE: 74 MMHG

## 2025-03-14 DIAGNOSIS — N93.9 ABNORMAL UTERINE BLEEDING: Primary | ICD-10-CM

## 2025-03-14 PROCEDURE — 3074F SYST BP LT 130 MM HG: CPT | Performed by: NURSE PRACTITIONER

## 2025-03-14 PROCEDURE — 3078F DIAST BP <80 MM HG: CPT | Performed by: NURSE PRACTITIONER

## 2025-03-14 PROCEDURE — 58100 BIOPSY OF UTERUS LINING: CPT | Performed by: NURSE PRACTITIONER

## 2025-03-18 RX ORDER — NORETHINDRONE ACETATE AND ETHINYL ESTRADIOL, ETHINYL ESTRADIOL AND FERROUS FUMARATE 1MG-10(24)
1 KIT ORAL DAILY
Qty: 28 TABLET | Refills: 5 | Status: SHIPPED | OUTPATIENT
Start: 2025-03-18 | End: 2026-03-18

## 2025-04-17 NOTE — PROCEDURES
Endometrial Biopsy    Pre-Procedure Care:   Consent was obtained.  Procedure/risks were explained.  Questions were answered.  Correct patient was identified.  Correct side and site were confirmed.      Birth control method(s) used:  ; date last used:  vasectomy    Pre-Medications:    The patient was premedicated with  declined .    Description of Procedure:  Under satisfactory analgesia, the patient was prepped and draped in the dorsal lithotomy position.   A bivalve speculum was placed in the vagina and the cervix was prepped with Betadine solution.   Single tooth tenaculum placed at the 12 o'clock position.   Cervical stenosis encountered.    The cervix was dilated using minidilators.   The uterine cavity was sounded at 8 cm.   The endometrial cavity was curetted for pipelle tissue sampling, 1 passes.  Specimen was sent to pathology.   The single tooth tenaculum was removed.     Good hemostasis was noted.  There were no complications.    There was no blood loss.      Discharge instructions were provided to the patient.    Visit Plan:  Ultrasound report was reviewed with the patient. If negative plan to start lo loestrin ocp. Follow up in 4 months to see how bleeding is    YOUSUF Prado     You will be due for your next colonoscopy in 3 years, which will be January 2028. Follow up 6 months prior to colonoscopy for office visit.  Recommend continuing folic acid supplementation 1 mg per day.   Follow up EGD as needed.   Monitor for long term side effects related to Nexium. May try over the counter Pepcid instead of Nexium.

## 2025-04-24 NOTE — TELEPHONE ENCOUNTER
Refill passed per [facility] protocol.    Last office visit 2/21/2025    Would you like to continue this medication?   4. Gastroesophageal reflux disease, unspecified whether esophagitis present  -     GASTRO - INTERNAL  History of GERD like symptoms not treated in past.  Trial of treatment.      Other orders  -     Omeprazole; Take 1 capsule (40 mg total) by mouth daily.  Dispense: 30 capsule; Refill: 1            Requested Prescriptions   Pending Prescriptions Disp Refills    OMEPRAZOLE 40 MG Oral Capsule Delayed Release [Pharmacy Med Name: OMEPRAZOLE DR 40 MG CAPSULE] 30 capsule 1     Sig: Take 1 capsule (40 mg total) by mouth daily.       Gastrointestional Medication Protocol Passed - 4/24/2025  7:40 AM        Passed - In person appointment or virtual visit in the past 12 mos or appointment in next 3 mos     Recent Outpatient Visits              [facility]    Office Visit    1 month ago Abnormal uterine bleeding    [facility]    Office Visit    2 months ago Routine general medical examination at a health care facility    [facility]    Office Visit    3 months ago Well woman exam with routine gynecological exam    [facility]    Office Visit    8 months ago Ear lesion    [facility]    Office Visit                      Passed - Medication is active on med list             Recent Outpatient Visits              1 month ago Abnormal uterine bleeding    [facility]    Office Visit    1 month ago Abnormal uterine bleeding    [facility]    Office Visit    2 months ago Routine general medical examination at a health care facility    [facility], [address], Dalton Pedersen DO    Office Visit    3 months ago Well woman exam with routine gynec[facility] exam    [facility]    Office Visit    8 months ago [facility] lesion    [facility]    Office Visit

## 2025-04-28 RX ORDER — OMEPRAZOLE 40 MG/1
40 CAPSULE, DELAYED RELEASE ORAL DAILY
Qty: 90 CAPSULE | Refills: 1 | Status: SHIPPED | OUTPATIENT
Start: 2025-04-28 | End: 2026-04-23

## 2025-08-05 RX ORDER — NORETHINDRONE ACETATE AND ETHINYL ESTRADIOL, ETHINYL ESTRADIOL AND FERROUS FUMARATE 1MG-10(24)
1 KIT ORAL DAILY
Qty: 28 TABLET | Refills: 5 | OUTPATIENT
Start: 2025-08-05 | End: 2026-08-05

## 2025-08-19 ENCOUNTER — TELEPHONE (OUTPATIENT)
Dept: OBGYN CLINIC | Facility: CLINIC | Age: 45
End: 2025-08-19

## 2025-08-19 DIAGNOSIS — Z76.0 MEDICATION REFILL: Primary | ICD-10-CM

## 2025-08-19 RX ORDER — NORETHINDRONE ACETATE AND ETHINYL ESTRADIOL, ETHINYL ESTRADIOL AND FERROUS FUMARATE 1MG-10(24)
1 KIT ORAL DAILY
Qty: 28 TABLET | Refills: 0 | Status: SHIPPED | OUTPATIENT
Start: 2025-08-19 | End: 2026-08-19

## (undated) NOTE — MR AVS SNAPSHOT
[place]  [address]  [phone]               Thank you for choosing us for your health care visit with Solitario Rascon MD.  We are glad to serve you and happy to provide you with this summa Summaries. If you've been to the Emergency Department or your doctor's office, you can view your past visit information in [employer] by going to Visits < Visit Summaries. [employer] questions? Call [phone] for help.   [employer] is NOT to be used for urge

## (undated) NOTE — LETTER
AUTHORIZATION FOR SURGICAL OPERATION OR OTHER PROCEDURE    1. I hereby authorize Dr. VELASCO , and [facility] staff assigned to my case to perform the following operation and/or procedure at the [facility] site:    ENDOMETRIAL BIOPSY     2.  My physician has explained the nature and purpose of the operation or other procedure, possible alternative methods of treatment, the risks involved, and the possibility of complication to me.  I acknowledge that no guarantee has been made as to the result that may be obtained.  3.  I recognize that, during the course of this operation, or other procedure, unforseen conditions may necessitate additional or different procedure than those listed above.  I, therefore, further authorize and request that the above named physician, his/her physician assistants or designees perform such procedures as are, in his/her professional opinion, necessary and desirable.  4.  Any tissue or organs removed in the operation or other procedure may be disposed of by and at the discretion of the [facility] and [facility].  5.  I understand that in the event of a medical emergency, I will be transported by local paramedics to [facility] or other hospital emergency department.  6.  I certify that I have read and fully understand the above consent to operation and/or other procedure.    7.  I acknowledge that my physician has explained sedation/analgesia administration to me including the risks and benefits.  I consent to the administration of sedation/analgesia as may be necessary or desirable in the judgement of my physician.    Witness signature: ___________________________________________________ Date:  ______/______/_____                    Time:  ________ A.M.  P.M.       Patient Name:  ______________________________________________________  (please print)      Patient signature:   ___________________________________________________             Relationship to Patient:           []  Parent    Responsible person                          []  Spouse  In case of minor or                    [] Other  _____________   Incompetent name:  __________________________________________________                               (please print)      _____________      Responsible person  In case of minor or  Incompetent signature:  _______________________________________________    Statement of Physician  My signature below affirms that prior to the time of the procedure, I have explained to the patient and/or his/her guardian, the risks and benefits involved in the proposed treatment and any reasonable alternative to the proposed treatment.  I have also explained the risks and benefits involved in the refusal of the proposed treatment and have answered the patient's questions.                        Date:  ______/______/_______  Provider                      Signature:  __________________________________________________________       Time:  ___________ A.M    P.M.